# Patient Record
Sex: FEMALE | Race: BLACK OR AFRICAN AMERICAN | NOT HISPANIC OR LATINO | Employment: FULL TIME | URBAN - METROPOLITAN AREA
[De-identification: names, ages, dates, MRNs, and addresses within clinical notes are randomized per-mention and may not be internally consistent; named-entity substitution may affect disease eponyms.]

---

## 2017-01-26 ENCOUNTER — MYC MEDICAL ADVICE (OUTPATIENT)
Dept: OBGYN | Facility: CLINIC | Age: 47
End: 2017-01-26

## 2017-01-27 NOTE — TELEPHONE ENCOUNTER
Annual note (9/7/16) states: Pap: (No results found for this basename: pap ) NA, pt had done 2013 elsewhere  Last annual note on 9/7/16: Pap is no longer needed d/t benign indication for MAURICE/BSO    Just want to clarify from note above, the pt does not need to get PAP's anymore? Routing pt's [employer] message below to Dr. Ramos.

## 2017-01-27 NOTE — TELEPHONE ENCOUNTER
Turned PAP screen off in pt health maintenance. Called pt and informed her of Dr. Ramos' note below. Pt had no further questions.

## 2017-01-27 NOTE — TELEPHONE ENCOUNTER
Ok, will wait to hear regarding remaining labs. Tell her she doesn't need paps anymore b/c of the hyst but the system doesn't recognize that. Please ask whoever is doing the health maintenance to put in her hyst in a recognizable way to the system so that it doesn't trigger needing a pap. I think KR knows what to do for this

## 2017-02-06 ENCOUNTER — TELEPHONE (OUTPATIENT)
Dept: OBGYN | Facility: CLINIC | Age: 47
End: 2017-02-06

## 2017-02-06 NOTE — TELEPHONE ENCOUNTER
LM on PHI stating if patient has not had a RP creatinine done with her other recent labs, that she should call back to set up lab appt since she's overdue.

## 2017-02-07 ENCOUNTER — TELEPHONE (OUTPATIENT)
Dept: OBGYN | Facility: CLINIC | Age: 47
End: 2017-02-07

## 2017-02-07 NOTE — TELEPHONE ENCOUNTER
Left message for patient to remind her that she is due for a creatinine recheck. Told patient to call us back to schedule a lab draw or let us know if she can get this done somewhere closer to [place] if [place] out of [place].

## 2017-03-20 ENCOUNTER — TELEPHONE (OUTPATIENT)
Dept: OBGYN | Facility: CLINIC | Age: 47
End: 2017-03-20

## 2017-03-20 NOTE — TELEPHONE ENCOUNTER
Fourth attempt. First three were calls with voicemails left. Sending letter regarding creatinine lab that is due and to call back to schedule an appt or if she's had done elsewhere to let us know.

## 2017-03-30 ENCOUNTER — MYC MEDICAL ADVICE (OUTPATIENT)
Dept: OBGYN | Facility: CLINIC | Age: 47
End: 2017-03-30

## 2017-03-31 NOTE — TELEPHONE ENCOUNTER
Ivon Fontana, [facility]        3/20/17 10:22 AM   Note      Fourth attempt. First three were calls with voicemails left. Sending letter regarding creatinine lab that is due and to call back to schedule an appt or if she's had done elsewhere to let us know.          Note routed to Dr. Ramos as an FYI- see pt's mychart message below.

## 2017-05-25 NOTE — TELEPHONE ENCOUNTER
I never got her [email or web address] message until now. A creatinine of 1.0 is normal, it's not too low. So we can let her know that. I'll wait to hear what her repeat was.

## 2017-08-31 DIAGNOSIS — F33.1 MODERATE EPISODE OF RECURRENT MAJOR DEPRESSIVE DISORDER (H): ICD-10-CM

## 2017-08-31 RX ORDER — BUPROPION HYDROCHLORIDE 300 MG/1
TABLET ORAL
Qty: 90 TABLET | Refills: 0 | OUTPATIENT
Start: 2017-08-31

## 2017-08-31 RX ORDER — BUPROPION HYDROCHLORIDE 300 MG/1
TABLET ORAL
Qty: 30 TABLET | Refills: 0 | Status: SHIPPED | OUTPATIENT
Start: 2017-08-31 | End: 2017-10-13

## 2017-08-31 NOTE — TELEPHONE ENCOUNTER
Pending Prescriptions:                       Disp   Refills    buPROPion (WELLBUTRIN XL) 300 MG 24 hr tab*90 tab*0        Sig: TAKE 1 TABLET BY MOUTH DAILY EVERY MORNING       Last Written Prescription Date: 09/07/16  Last Fill Quantity: 90; # refills: 3  Last Office Visit with [facility], [facility] or [facility] prescribing provider:  11/11/16   Future visit: None, due for annual next month (Sept)    Last PHQ-9 score on record=   PHQ-9 SCORE 11/11/2016   Total Score 4       Lab Results   Component Value Date    AST 13 11/11/2016     Lab Results   Component Value Date    ALT 26 11/11/2016

## 2017-09-06 ENCOUNTER — MYC REFILL (OUTPATIENT)
Dept: OBGYN | Facility: CLINIC | Age: 47
End: 2017-09-06

## 2017-09-06 DIAGNOSIS — N95.1 SYMPTOMS, SUCH AS FLUSHING, SLEEPLESSNESS, HEADACHE, LACK OF CONCENTRATION, ASSOCIATED WITH THE MENOPAUSE: ICD-10-CM

## 2017-09-07 NOTE — TELEPHONE ENCOUNTER
BIEST      Last Written Prescription Date:  9/7/16  Last Fill Quantity: 17ml,   # refills: 12  Last Office Visit with [facility] primary care provider:  9/7/16  Future Office visit: 10/13/17    Progesterone 10%      Last Written Prescription Date:  9/7/16  Last Fill Quantity: 30mL,   # refills: 12  Last Office Visit with [facility] primary care provider:  9/7/16  Future Office visit: 10/13/17    Routing refill request to provider for review/approval because:  Drug not on the [facility], [facility] or [facility] refill protocol or controlled substance. Pt due for annual, appt scheduled. Routing to Dr. Ramos as RN cannot sign for BIEST. Ok for refill?

## 2017-09-07 NOTE — TELEPHONE ENCOUNTER
Message from [employer]:  Original authorizing provider: MD Ximena Freire would like a refill of the following medications:  order for DME [Muna Ramos MD]  order for DME [Muna Ramos MD]    Preferred pharmacy: [facility] fax [phone]    Comment:

## 2017-09-08 NOTE — TELEPHONE ENCOUNTER
Pt calling to say that the pharmacy says they never received the faxed RX's for her Progesterone 10%  /  Biest 2mg  I reviewed the faxed  Information w/ the pt. Pt indicates that she did just schedule her annual exam next month. She is requesting a 2 month supply until she sees Dr Ramos.  I called [facility] @ [phone] and gave a verbal order  to Bambi the pharmacist for 2 month supply for both Progesterone 10% and Biest 2mg.  Contacted the pt to let her know her Rx is being processed at her pharmacy.

## 2017-10-13 ENCOUNTER — OFFICE VISIT (OUTPATIENT)
Dept: OBGYN | Facility: CLINIC | Age: 47
End: 2017-10-13

## 2017-10-13 VITALS
DIASTOLIC BLOOD PRESSURE: 66 MMHG | SYSTOLIC BLOOD PRESSURE: 120 MMHG | WEIGHT: 161 LBS | BODY MASS INDEX: 28.53 KG/M2 | HEIGHT: 63 IN | HEART RATE: 72 BPM

## 2017-10-13 DIAGNOSIS — N95.1 SYMPTOMS, SUCH AS FLUSHING, SLEEPLESSNESS, HEADACHE, LACK OF CONCENTRATION, ASSOCIATED WITH THE MENOPAUSE: ICD-10-CM

## 2017-10-13 DIAGNOSIS — G43.109 MIGRAINE WITH AURA AND WITHOUT STATUS MIGRAINOSUS, NOT INTRACTABLE: ICD-10-CM

## 2017-10-13 DIAGNOSIS — F33.1 MODERATE EPISODE OF RECURRENT MAJOR DEPRESSIVE DISORDER (H): ICD-10-CM

## 2017-10-13 DIAGNOSIS — Z01.419 ENCOUNTER FOR GYNECOLOGICAL EXAMINATION WITHOUT ABNORMAL FINDING: Primary | ICD-10-CM

## 2017-10-13 DIAGNOSIS — R79.89 ELEVATED SERUM CREATININE: ICD-10-CM

## 2017-10-13 PROCEDURE — 99396 PREV VISIT EST AGE 40-64: CPT | Performed by: OBSTETRICS & GYNECOLOGY

## 2017-10-13 PROCEDURE — 99214 OFFICE O/P EST MOD 30 MIN: CPT | Mod: 25 | Performed by: OBSTETRICS & GYNECOLOGY

## 2017-10-13 RX ORDER — BUPROPION HYDROCHLORIDE 300 MG/1
TABLET ORAL
Qty: 90 TABLET | Refills: 3 | Status: SHIPPED | OUTPATIENT
Start: 2017-10-13 | End: 2018-11-14

## 2017-10-13 RX ORDER — ESTRADIOL 0.07 MG/D
1 FILM, EXTENDED RELEASE TRANSDERMAL
Qty: 8 PATCH | Refills: 3 | Status: SHIPPED | OUTPATIENT
Start: 2017-10-16 | End: 2017-10-13

## 2017-10-13 ASSESSMENT — ANXIETY QUESTIONNAIRES
6. BECOMING EASILY ANNOYED OR IRRITABLE: SEVERAL DAYS
3. WORRYING TOO MUCH ABOUT DIFFERENT THINGS: MORE THAN HALF THE DAYS
7. FEELING AFRAID AS IF SOMETHING AWFUL MIGHT HAPPEN: SEVERAL DAYS
GAD7 TOTAL SCORE: 9
IF YOU CHECKED OFF ANY PROBLEMS ON THIS QUESTIONNAIRE, HOW DIFFICULT HAVE THESE PROBLEMS MADE IT FOR YOU TO DO YOUR WORK, TAKE CARE OF THINGS AT HOME, OR GET ALONG WITH OTHER PEOPLE: SOMEWHAT DIFFICULT
5. BEING SO RESTLESS THAT IT IS HARD TO SIT STILL: SEVERAL DAYS
2. NOT BEING ABLE TO STOP OR CONTROL WORRYING: SEVERAL DAYS
1. FEELING NERVOUS, ANXIOUS, OR ON EDGE: MORE THAN HALF THE DAYS

## 2017-10-13 ASSESSMENT — PATIENT HEALTH QUESTIONNAIRE - PHQ9
SUM OF ALL RESPONSES TO PHQ QUESTIONS 1-9: 7
5. POOR APPETITE OR OVEREATING: SEVERAL DAYS

## 2017-10-13 NOTE — Clinical Note
Do you mind calling her one last time and ask her if she ever had the kidney test repeated in [place] since I totally forgot to ask her yesterday? If she did can we get her other clinics contact info and request results, and if she didn't, can we make sure she comes in some time this week while she's in [place] and does it. Doesn't have to be fasting so can come anytime her schedule allows. Thx!

## 2017-10-13 NOTE — MR AVS SNAPSHOT
After Visit Summary   10/13/2017    Ximena Lindsey    MRN: [id number]           Patient Information     Date Of Birth          1970        Visit Information        Provider Department      10/13/2017 3:00 PM Muna Ramos MD [facility]        Today's Diagnoses     Encounter for gynecological examination without abnormal finding    -  1    Moderate episode of recurrent major depressive disorder (H)        Symptoms, such as flushing, sleeplessness, headache, lack of concentration, associated with the menopause        Elevated serum creatinine        Migraine with aura and without status migrainosus, not intractable           Follow-ups after your visit        Who to contact     If you have questions or need follow up information about today's clinic visit or your schedule please contact [facility] directly at [phone].  Normal or non-critical lab and imaging results will be communicated to you by MyChart, letter or phone within 4 business days after the clinic has received the results. If you do not hear from us within 7 days, please contact the clinic through MyChart or phone. If you have a critical or abnormal lab result, we will notify you by phone as soon as possible.  Submit refill requests through [facility] or call your pharmacy and they will forward the refill request to us. Please allow 3 business days for your refill to be completed.          Additional Information About Your Visit        MyChart Information     [facility] gives you secure access to your electronic health record. If you see a primary care provider, you can also send messages to your care team and make appointments. If you have questions, please call your primary care clinic.  If you do not have a primary care provider, please call [phone] and they will assist you.        Care EveryWhere ID     This is your Care EveryWhere ID. This could be used by other organizations  "to access your [place] medical records  [id number]        Your Vitals Were     Pulse Height BMI (Body Mass Index)             72 5' 3\" (1.6 m) 28.52 kg/m2          Blood Pressure from Last 3 Encounters:   10/13/17 120/66   11/11/16 122/80   09/07/16 118/60    Weight from Last 3 Encounters:   10/13/17 161 lb (73 kg)   11/11/16 165 lb (74.8 kg)   09/07/16 170 lb (77.1 kg)              Today, you had the following     No orders found for display         Today's Medication Changes          These changes are accurate as of: 10/13/17 11:59 PM.  If you have any questions, ask your nurse or doctor.               Start taking these medicines.        Dose/Directions    estradiol 0.075 MG/24HR BIW patch   Commonly known as:  VIVELLE-DOT   Used for:  Symptoms, such as flushing, sleeplessness, headache, lack of concentration, associated with the menopause   Started by:  Muna Ramos MD        Dose:  1 patch   Start taking on:  10/16/2017   Place 1 patch onto the skin twice a week   Quantity:  8 patch   Refills:  3         These medicines have changed or have updated prescriptions.        Dose/Directions    buPROPion 300 MG 24 hr tablet   Commonly known as:  WELLBUTRIN XL   This may have changed:  See the new instructions.   Used for:  Moderate episode of recurrent major depressive disorder (H)   Changed by:  Muna Ramos MD        TAKE 1 TABLET BY MOUTH DAILY EVERY MORNING   Quantity:  90 tablet   Refills:  3         Stop taking these medicines if you haven't already. Please contact your care team if you have questions.     order for DME   Stopped by:  Muna Ramos MD                Where to get your medicines      These medications were sent to [facility] [place], [place] - [address] AT [facility] [address], [facility] [address]     Phone:  [phone]     buPROPion 300 MG 24 hr tablet    estradiol 0.075 MG/24HR BIW patch                Primary Care Provider Fax # "    Provider Not In System [phone]                Equal Access to Services     AURY FELICIANO : Hadii aad ku hadhemantluke Fragoso, michelle fonseca, vlad espinozamalinda eastman, mihir vargas. So [facility] [phone].    ATENCIÓN: Si habla español, tiene a landeros disposición servicios gratuitos de asistencia lingüística. Llame al [phone].    We comply with applicable federal civil rights laws and [place] laws. We do not discriminate on the basis of race, color, national origin, age, disability, sex, sexual orientation, or gender identity.            Thank you!     Thank you for choosing [facility]  for your care. Our goal is always to provide you with excellent care. Hearing back from our patients is one way we can continue to improve our services. Please take a few minutes to complete the written survey that you may receive in the mail after your visit with us. Thank you!             Your Updated Medication List - Protect others around you: Learn how to safely use, store and throw away your medicines at www.disposemymeds.org.          This list is accurate as of: 10/13/17 11:59 PM.  Always use your most recent med list.                   Brand Name Dispense Instructions for use Diagnosis    buPROPion 300 MG 24 hr tablet    WELLBUTRIN XL    90 tablet    TAKE 1 TABLET BY MOUTH DAILY EVERY MORNING    Moderate episode of recurrent major depressive disorder (H)       estradiol 0.075 MG/24HR BIW patch   Start taking on:  10/16/2017    VIVELLE-DOT    8 patch    Place 1 patch onto the skin twice a week    Symptoms, such as flushing, sleeplessness, headache, lack of concentration, associated with the menopause       topiramate 50 MG tablet    TOPAMAX
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
## 2017-10-14 PROBLEM — R79.89 ELEVATED SERUM CREATININE: Status: ACTIVE | Noted: 2017-10-14

## 2017-10-14 PROBLEM — G43.109 MIGRAINE WITH AURA AND WITHOUT STATUS MIGRAINOSUS, NOT INTRACTABLE: Status: ACTIVE | Noted: 2017-10-14

## 2017-10-14 ASSESSMENT — ANXIETY QUESTIONNAIRES: GAD7 TOTAL SCORE: 9

## 2017-10-16 RX ORDER — ESTRADIOL 0.07 MG/D
FILM, EXTENDED RELEASE TRANSDERMAL
Qty: 24 PATCH | Refills: 3 | Status: SHIPPED | OUTPATIENT
Start: 2017-10-16 | End: 2018-06-11 | Stop reason: ALTCHOICE

## 2017-10-16 NOTE — TELEPHONE ENCOUNTER
"Estradiol 0.075      Last Written Prescription Date:  10/16/17  Last Fill Quantity: 8,   # refills: 3  Last Office Visit with [facility] primary care provider:  10/13/17  Future Office visit: none    POC note 10/13/17: \"B/c her current HRT is unknown in comparison to regular HRT we will switch her to a .075mg vivelle patch and then in 6 months if feeling well can try to wean her to .05mg or she could even just cut a sliver off the patch and self wean until we find the dose that works for her but is the lowest dose possible. If her .075mg isn't enough she will know this in 1 week at most and then we can adjust up on it.  \"    Routing refill request to provider for review/approval because:  Pharmacy requesting a 90 day supply. Routing to Dr. Ramos- ok to adjust dispense amount to 24 patches with 3 rf to give year supply?   "

## 2017-12-19 ENCOUNTER — MYC REFILL (OUTPATIENT)
Dept: OBGYN | Facility: CLINIC | Age: 47
End: 2017-12-19

## 2017-12-19 DIAGNOSIS — N95.1 SYMPTOMS, SUCH AS FLUSHING, SLEEPLESSNESS, HEADACHE, LACK OF CONCENTRATION, ASSOCIATED WITH THE MENOPAUSE: ICD-10-CM

## 2017-12-19 RX ORDER — ESTRADIOL 0.07 MG/D
FILM, EXTENDED RELEASE TRANSDERMAL
Qty: 24 PATCH | Refills: 3 | Status: CANCELLED | OUTPATIENT
Start: 2017-12-19

## 2017-12-19 NOTE — TELEPHONE ENCOUNTER
Please see my chart below. Routing to Dr. Ramos. Please advise.     Pt is currently taking estradiol (VIVELLE-DOT) 0.075 MG/24HR BIW patch which she is cutting in half.     10/13/17 After our discussion the patient did feel that this was the best approach. B/c her current HRT is unknown in comparison to regular HRT we will switch her to a .075mg vivelle patch and then in 6 months if feeling well can try to wean her to .05mg or she could even just cut a sliver off the patch and self wean until we find the dose that works for her but is the lowest dose possible. If her .075mg isn't enough she will know this in 1 week at most and then we can adjust up on it.

## 2017-12-19 NOTE — TELEPHONE ENCOUNTER
Message from Solais Lightingt:  Original authorizing provider: MD Ximena Freirelanette Lindsey would like a refill of the following medications:  estradiol (VIVELLE-DOT) 0.075 MG/24HR BIW patch [Muna Ramos MD]    Preferred pharmacy: Other - [facility] [address], [place] [address]    Comment:  I've been cutting the patch like Dr. Ramos said I could do. I was having major breast tenderness. I need to go down in strength this time. Please send to [facility] [address] [place] [address] [phone] Thank you!

## 2017-12-21 RX ORDER — ESTRADIOL 0.05 MG/D
1 PATCH, EXTENDED RELEASE TRANSDERMAL
Qty: 24 PATCH | Refills: 1 | Status: SHIPPED | OUTPATIENT
Start: 2017-12-21 | End: 2018-06-11

## 2017-12-21 NOTE — TELEPHONE ENCOUNTER
Has she been cutting in half or a sliver off or what?   We can send the .05mg dose and then she can start with that or cut that one down a bit until gets to dose that works best for her.   Usually the breast tenderness does get better with some time.   Can send the .05mg patches #24 with 1 RF   AJ     Per Sharri RN's not below pt is cutting the patch in half.   Rx sent, [employer] message sent to pt.

## 2017-12-31 ENCOUNTER — HEALTH MAINTENANCE LETTER (OUTPATIENT)
Age: 47
End: 2017-12-31

## 2018-04-01 ENCOUNTER — TRANSFERRED RECORDS (OUTPATIENT)
Dept: HEALTH INFORMATION MANAGEMENT | Facility: CLINIC | Age: 48
End: 2018-04-01

## 2018-06-10 DIAGNOSIS — N95.1 SYMPTOMS, SUCH AS FLUSHING, SLEEPLESSNESS, HEADACHE, LACK OF CONCENTRATION, ASSOCIATED WITH THE MENOPAUSE: ICD-10-CM

## 2018-06-11 ENCOUNTER — MYC REFILL (OUTPATIENT)
Dept: OBGYN | Facility: CLINIC | Age: 48
End: 2018-06-11

## 2018-06-11 DIAGNOSIS — N95.1 SYMPTOMS, SUCH AS FLUSHING, SLEEPLESSNESS, HEADACHE, LACK OF CONCENTRATION, ASSOCIATED WITH THE MENOPAUSE: ICD-10-CM

## 2018-06-11 RX ORDER — ESTRADIOL 0.05 MG/D
1 PATCH, EXTENDED RELEASE TRANSDERMAL
Qty: 24 PATCH | Refills: 1 | Status: SHIPPED | OUTPATIENT
Start: 2018-06-11 | End: 2018-11-14

## 2018-06-11 NOTE — TELEPHONE ENCOUNTER
Requested Prescriptions   Pending Prescriptions Disp Refills     estradiol (VIVELLE-DOT) 0.05 MG/24HR BIW patch 24 patch 1     Sig: Place 1 patch onto the skin twice a week   Last Written Prescription Date:  12/21/17  Last Fill Quantity: 24,  # refills: 1   Last office visit: 10/13/2017 with prescribing provider:  Dr. Ramos   Future Office Visit:      There is no refill protocol information for this order        Routing to Dr. Ramos. OK to send refill.       12/21/17 Has she been cutting in half or a sliver off or what?   We can send the .05mg dose and then she can start with that or cut that one down a bit until gets to dose that works best for her.   Usually the breast tenderness does get better with some time.   Can send the .05mg patches #24 with 1 RF   AJ

## 2018-06-12 RX ORDER — ESTRADIOL 0.05 MG/D
PATCH, EXTENDED RELEASE TRANSDERMAL
Qty: 24 PATCH | Refills: 0 | OUTPATIENT
Start: 2018-06-12

## 2018-06-12 NOTE — TELEPHONE ENCOUNTER
"Requested Prescriptions   Pending Prescriptions Disp Refills     estradiol (VIVELLE-DOT) 0.05 MG/24HR BIW patch [Pharmacy Med Name: ESTRADIOL 0.05MG PATCH (TWICE WK)] 24 patch 0     Sig: PLACE 1 PATCH ONTO SKIN TWICE A WEEK   Last Written Prescription Date:  6/11/18  Last Fill Quantity: 24,  # refills: 1   Last office visit: 10/13/2017 with prescribing provider:  Dr. Ramos   Future Office Visit:      Hormone Replacement Therapy Passed    6/10/2018 10:37 AM       Passed - Blood pressure under 140/90 in past 12 months    BP Readings from Last 3 Encounters:   10/13/17 120/66   11/11/16 122/80   09/07/16 118/60                Passed - Recent (12 mo) or future (30 days) visit within the authorizing provider's specialty    Patient had office visit in the last 12 months or has a visit in the next 30 days with authorizing provider or within the authorizing provider's specialty.  See \"Patient Info\" tab in inbasket, or \"Choose Columns\" in Meds & Orders section of the refill encounter.           Passed - Patient is 18 years of age or older       Passed - No active pregnancy on record       Passed - No positive pregnancy test on record in past 12 months      Duplicate. Rx sent 6/11/18 for 24 with 1 RF  "

## 2018-11-14 DIAGNOSIS — N95.1 SYMPTOMS, SUCH AS FLUSHING, SLEEPLESSNESS, HEADACHE, LACK OF CONCENTRATION, ASSOCIATED WITH THE MENOPAUSE: ICD-10-CM

## 2018-11-14 RX ORDER — ESTRADIOL 0.05 MG/D
PATCH, EXTENDED RELEASE TRANSDERMAL
Qty: 25 PATCH | Refills: 0 | Status: SHIPPED | OUTPATIENT
Start: 2018-11-14 | End: 2019-01-21

## 2018-11-14 NOTE — TELEPHONE ENCOUNTER
"Requested Prescriptions   Pending Prescriptions Disp Refills     estradiol (VIVELLE-DOT) 0.05 MG/24HR BIW patch [Pharmacy Med Name: ESTRADIOL 0.05MG PATCH (TWICE WK)] 25 patch 0     Sig: PLACE 1 PATCH TOPICALLY ONTO CLEAN SKIN TWICE WEEKLY    Hormone Replacement Therapy Failed    11/14/2018  4:34 PM       Failed - Blood pressure under 140/90 in past 12 months    BP Readings from Last 3 Encounters:   10/13/17 120/66   11/11/16 122/80   09/07/16 118/60                Failed - Recent (12 mo) or future (30 days) visit within the authorizing provider's specialty    Patient had office visit in the last 12 months or has a visit in the next 30 days with authorizing provider or within the authorizing provider's specialty.  See \"Patient Info\" tab in inbasket, or \"Choose Columns\" in Meds & Orders section of the refill encounter.             Passed - Patient is 18 years of age or older       Passed - No active pregnancy on record       Passed - No positive pregnancy test on record in past 12 months      Last Written Prescription Date:  11/15/18  Last Fill Quantity: 24,  # refills: 0   Last office visit: 10/13/2017 with prescribing provider:  Rachel   Future Office Visit:   Next 5 appointments (look out 90 days)     Jan 21, 2019 11:20 AM CST   PHYSICAL with Muna Ramos MD   [facility] ([facility])    [address] [place] [id number]   [phone]               Refill approved. Pt has appointment.   "

## 2019-01-21 ENCOUNTER — OFFICE VISIT (OUTPATIENT)
Dept: OBGYN | Facility: CLINIC | Age: 49
End: 2019-01-21
Payer: MEDICAID

## 2019-01-21 ENCOUNTER — ANCILLARY PROCEDURE (OUTPATIENT)
Dept: MAMMOGRAPHY | Facility: CLINIC | Age: 49
End: 2019-01-21
Payer: MEDICAID

## 2019-01-21 VITALS
BODY MASS INDEX: 25.95 KG/M2 | SYSTOLIC BLOOD PRESSURE: 112 MMHG | DIASTOLIC BLOOD PRESSURE: 68 MMHG | WEIGHT: 152 LBS | HEIGHT: 64 IN | HEART RATE: 68 BPM

## 2019-01-21 DIAGNOSIS — Z13.6 ENCOUNTER FOR LIPID SCREENING FOR CARDIOVASCULAR DISEASE: ICD-10-CM

## 2019-01-21 DIAGNOSIS — Z13.21 ENCOUNTER FOR VITAMIN DEFICIENCY SCREENING: ICD-10-CM

## 2019-01-21 DIAGNOSIS — N95.1 SYMPTOMS, SUCH AS FLUSHING, SLEEPLESSNESS, HEADACHE, LACK OF CONCENTRATION, ASSOCIATED WITH THE MENOPAUSE: ICD-10-CM

## 2019-01-21 DIAGNOSIS — K20.90 ESOPHAGITIS DETERMINED BY BIOPSY: ICD-10-CM

## 2019-01-21 DIAGNOSIS — Z13.228 SCREENING FOR METABOLIC DISORDER: ICD-10-CM

## 2019-01-21 DIAGNOSIS — Z13.29 SCREENING FOR THYROID DISORDER: ICD-10-CM

## 2019-01-21 DIAGNOSIS — F33.1 MODERATE EPISODE OF RECURRENT MAJOR DEPRESSIVE DISORDER (H): ICD-10-CM

## 2019-01-21 DIAGNOSIS — Z01.419 ENCOUNTER FOR GYNECOLOGICAL EXAMINATION WITHOUT ABNORMAL FINDING: Primary | ICD-10-CM

## 2019-01-21 DIAGNOSIS — Z12.31 VISIT FOR SCREENING MAMMOGRAM: ICD-10-CM

## 2019-01-21 DIAGNOSIS — R79.89 ELEVATED SERUM CREATININE: ICD-10-CM

## 2019-01-21 DIAGNOSIS — G43.019 INTRACTABLE MIGRAINE WITHOUT AURA AND WITHOUT STATUS MIGRAINOSUS: ICD-10-CM

## 2019-01-21 DIAGNOSIS — E53.8 LOW SERUM VITAMIN B12: ICD-10-CM

## 2019-01-21 DIAGNOSIS — Z13.220 ENCOUNTER FOR LIPID SCREENING FOR CARDIOVASCULAR DISEASE: ICD-10-CM

## 2019-01-21 LAB — VIT B12 SERPL-MCNC: 230 PG/ML (ref 193–986)

## 2019-01-21 PROCEDURE — 82306 VITAMIN D 25 HYDROXY: CPT | Performed by: OBSTETRICS & GYNECOLOGY

## 2019-01-21 PROCEDURE — 99396 PREV VISIT EST AGE 40-64: CPT | Performed by: OBSTETRICS & GYNECOLOGY

## 2019-01-21 PROCEDURE — 80061 LIPID PANEL: CPT | Performed by: OBSTETRICS & GYNECOLOGY

## 2019-01-21 PROCEDURE — 77067 SCR MAMMO BI INCL CAD: CPT | Mod: TC

## 2019-01-21 PROCEDURE — 36415 COLL VENOUS BLD VENIPUNCTURE: CPT | Performed by: OBSTETRICS & GYNECOLOGY

## 2019-01-21 PROCEDURE — 82607 VITAMIN B-12: CPT | Performed by: OBSTETRICS & GYNECOLOGY

## 2019-01-21 PROCEDURE — 84443 ASSAY THYROID STIM HORMONE: CPT | Performed by: OBSTETRICS & GYNECOLOGY

## 2019-01-21 PROCEDURE — 80053 COMPREHEN METABOLIC PANEL: CPT | Performed by: OBSTETRICS & GYNECOLOGY

## 2019-01-21 RX ORDER — OMEPRAZOLE 40 MG/1
CAPSULE, DELAYED RELEASE ORAL
Refills: 2 | COMMUNITY
Start: 2018-06-14 | End: 2019-01-21

## 2019-01-21 RX ORDER — OMEPRAZOLE 40 MG/1
CAPSULE, DELAYED RELEASE ORAL
Qty: 90 CAPSULE | Refills: 3 | Status: SHIPPED | OUTPATIENT
Start: 2019-01-21 | End: 2020-01-20

## 2019-01-21 RX ORDER — BUPROPION HYDROCHLORIDE 300 MG/1
TABLET ORAL
Qty: 90 TABLET | Refills: 3 | Status: SHIPPED | OUTPATIENT
Start: 2019-01-21 | End: 2020-01-20

## 2019-01-21 RX ORDER — ESTRADIOL 0.05 MG/D
PATCH, EXTENDED RELEASE TRANSDERMAL
Qty: 25 PATCH | Refills: 3 | Status: SHIPPED | OUTPATIENT
Start: 2019-01-21 | End: 2020-01-20

## 2019-01-21 RX ORDER — TOPIRAMATE 50 MG/1
50 TABLET, FILM COATED ORAL DAILY
Qty: 90 TABLET | Refills: 3 | Status: SHIPPED | OUTPATIENT
Start: 2019-01-21 | End: 2019-06-13

## 2019-01-21 ASSESSMENT — ANXIETY QUESTIONNAIRES
7. FEELING AFRAID AS IF SOMETHING AWFUL MIGHT HAPPEN: NOT AT ALL
1. FEELING NERVOUS, ANXIOUS, OR ON EDGE: SEVERAL DAYS
GAD7 TOTAL SCORE: 7
3. WORRYING TOO MUCH ABOUT DIFFERENT THINGS: SEVERAL DAYS
5. BEING SO RESTLESS THAT IT IS HARD TO SIT STILL: SEVERAL DAYS
IF YOU CHECKED OFF ANY PROBLEMS ON THIS QUESTIONNAIRE, HOW DIFFICULT HAVE THESE PROBLEMS MADE IT FOR YOU TO DO YOUR WORK, TAKE CARE OF THINGS AT HOME, OR GET ALONG WITH OTHER PEOPLE: SOMEWHAT DIFFICULT
2. NOT BEING ABLE TO STOP OR CONTROL WORRYING: SEVERAL DAYS
6. BECOMING EASILY ANNOYED OR IRRITABLE: SEVERAL DAYS

## 2019-01-21 ASSESSMENT — PATIENT HEALTH QUESTIONNAIRE - PHQ9
5. POOR APPETITE OR OVEREATING: MORE THAN HALF THE DAYS
SUM OF ALL RESPONSES TO PHQ QUESTIONS 1-9: 9

## 2019-01-21 ASSESSMENT — MIFFLIN-ST. JEOR: SCORE: 1291.53

## 2019-01-21 NOTE — PROGRESS NOTES
Please abstract the following data from this visit with this patient into the appropriate field in Epic:    Colonoscopy done on this date: 04/01/2018 (approximately), by this group: [facility], results were normal per pt. 5 year f/u due to family hx

## 2019-01-21 NOTE — PROGRESS NOTES
Ximena is a 48 year old  female who presents for annual exam.     Besides routine health maintenance, she has no other health concerns today .    HPI:  The patient's PCP is [facility]. Wondering about a PCP.    Patient has now moved full time to [place] from [place]. Is now  but still going through a ton of legal things to determine finances and alimony, etc.  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 Is living with her sister. Nice to have the social support there but also is feeling like she's in a tiny room with all her stuff in a suit case so that's hard.    Son dropped out of  and now isn't doing anything and his dad is enabling him but she can't do anything about that. Oldest daughter is still out in [place]. with her much older BF and not speaking to anyone including her sibs so literally knows nothing about her other than she's alive. Her middle daughter is the only one that seems to be doing the right things. Going to college. Came to [place] to visit over xmas. Trying to be the glue that keeps everyone else together as much as she can.    Has definitely been depressed over everythign but trying to be optimistic. Def thinks the wellbutrin is helping some but the rest is just having to process and go through it all. Knows that counseling may help but also can't afford to pay for it. Has lost quite a bit of weight and knows it's mostly unintentional b/c of sadness and stress and not because of diet and exercise. However isn't upset about it and still not at the BMi she'd want to be at either way.    Doing well with her ERT and wants to continue on it. No vasomotor sx    Had some gerd and chest pain. Went to GI and had EGD and proven esophagitis from gerd. prilosec is working for it though  topamax for migraine  prevention. Hasn't really had migraines in a long time but worried to stop it and get them back so would rather stay on it  Hasn't had fasting labs in a while and had some elevated kidney function in the past so wants to do labs again today      GYNECOLOGIC HISTORY:    No LMP recorded. Patient has had a hysterectomy.  Her current contraception method is: hysterectomy.  She  reports that  has never smoked. she has never used smokeless tobacco.    Patient is sexually active.  STD testing offered?  Declined  Last PHQ-9 score on record =   PHQ-9 SCORE 2019   PHQ-9 Total Score 9     Last GAD7 score on record =   EDVIN-7 SCORE 2019   Total Score 7     Alcohol Score = 4    HEALTH MAINTENANCE:  Cholesterol:  16   Total= 223, Triglycerides=92, HDL=74, SFL=476, FBS=77 (16), TSH=2.28  Last Mammo: 2 years ago, Result: normal, Next Mammo: today   Pap: NA, pt had hysterectomy  Colonoscopy:  Spring 2018 in [place], Result: normal, Next Colonoscopy: 5 years due to family hx.  Dexa:  Never    Health maintenance updated:  yes    HISTORY:  Obstetric History       T3      L3     SAB0   TAB0   Ectopic0   Multiple0   Live Births0       # Outcome Date GA Lbr Jorge/2nd Weight Sex Delivery Anes PTL Lv   3 Term 00    M       2 Term 97    F       1 Term 90    F              Patient Active Problem List   Diagnosis     Moderate episode of recurrent major depressive disorder (H)     Symptoms, such as flushing, sleeplessness, headache, lack of concentration, associated with the menopause     S/P total hysterectomy and bilateral salpingo-oophorectomy     Elevated serum creatinine     Migraine with aura and without status migrainosus, not intractable     Past Surgical History:   Procedure Laterality Date     HYSTERECTOMY VAGINAL, BILATERAL SALPINGO-OOPHERECTOMY, COMBINED  2003     TONSILLECTOMY        Social History     Tobacco Use     Smoking status: Never Smoker     Smokeless tobacco: Never Used  "  Substance Use Topics     Alcohol use: Yes     Alcohol/week: 0.0 oz      Problem (# of Occurrences) Relation (Name,Age of Onset)    Lactose Intolerance (1) Father (55)            Current Outpatient Medications   Medication Sig     buPROPion (WELLBUTRIN XL) 300 MG 24 hr tablet TAKE 1 TABLET BY MOUTH DAILY EVERY MORNING     estradiol (VIVELLE-DOT) 0.05 MG/24HR bi-weekly patch PLACE 1 PATCH TOPICALLY ONTO CLEAN SKIN TWICE WEEKLY     omeprazole (PRILOSEC) 40 MG DR capsule TK 1 C PO QAM     topiramate (TOPAMAX) 50 MG tablet Take 1 tablet (50 mg) by mouth daily     No current facility-administered medications for this visit.      No Known Allergies    Past medical, surgical, social and family histories were reviewed and updated in EPIC.    ROS:   12 point review of systems negative other than symptoms noted below.  Head: Nasal Congestion    EXAM:  /68   Pulse 68   Ht 1.613 m (5' 3.5\")   Wt 68.9 kg (152 lb)   BMI 26.50 kg/m     BMI: Body mass index is 26.5 kg/m .    PHYSICAL EXAM:  Constitutional:  Appearance: Well nourished, well developed, alert, in no acute distress  Neck:  Lymph Nodes:  No lymphadenopathy present    Thyroid:  Gland size normal, nontender, no nodules or masses present  on palpation  Chest:  Respiratory Effort:  Breathing unlabored  Cardiovascular:    Heart: Auscultation:  Regular rate, normal rhythm, no murmurs present  Breasts: Palpation of Breasts and Axillae:  No masses present on palpation, no breast tenderness. and No nodularity, asymmetry or nipple discharge bilaterally.  Gastrointestinal:   Abdominal Examination:  Abdomen nontender to palpation, tone normal without rigidity or guarding, no masses present, umbilicus without lesions   Liver and Spleen:  No hepatomegaly present, liver nontender to palpation    Hernias:  No hernias present  Lymphatic: Lymph Nodes:  No other lymphadenopathy present  Skin:  General Inspection:  No rashes present, no lesions present, no areas of "  discoloration    Genitalia and Groin:  No rashes present, no lesions present, no areas of  discoloration, no masses present  Neurologic/Psychiatric:    Mental Status:  Oriented X3     Pelvic Exam:  External Genitalia:     Normal appearance for age, no discharge present, no tenderness present, no inflammatory lesions present, color normal  Vagina:     Normal vaginal vault without central or paravaginal defects, no discharge present, no inflammatory lesions present, no masses present  Bladder:     Nontender to palpation  Urethra:   Urethral Body:  Urethra palpation normal, urethra structural support normal   Urethral Meatus:  No erythema or lesions present  Cervix:     Surgically absent  Uterus:     Surgically absent  Adnexa:     Surgically absent  Perineum:     Perineum within normal limits, no evidence of trauma, no rashes or skin lesions present  Anus:     Anus within normal limits, no hemorrhoids present  Inguinal Lymph Nodes:     No lymphadenopathy present    COUNSELING:   Reviewed preventive health counseling, as reflected in patient instructions    BMI: Body mass index is 26.5 kg/m .      ASSESSMENT:  48 year old female with satisfactory annual exam.    ICD-10-CM    1. Encounter for gynecological examination without abnormal finding Z01.419    2. Moderate episode of recurrent major depressive disorder (H) F33.1 buPROPion (WELLBUTRIN XL) 300 MG 24 hr tablet   3. Symptoms, such as flushing, sleeplessness, headache, lack of concentration, associated with the menopause N95.1 estradiol (VIVELLE-DOT) 0.05 MG/24HR bi-weekly patch   4. Encounter for lipid screening for cardiovascular disease Z13.220 Lipid Profile    Z13.6    5. Screening for metabolic disorder Z13.228 Comprehensive metabolic panel   6. Elevated serum creatinine R79.89 Comprehensive metabolic panel   7. Screening for thyroid disorder Z13.29 TSH with free T4 reflex   8. Encounter for vitamin deficiency screening Z13.21 Vitamin D Deficiency     Vitamin  B12   9. Low serum vitamin B12 R79.89 Vitamin B12   10. Esophagitis determined by biopsy K20.9 omeprazole (PRILOSEC) 40 MG DR capsule   11. Intractable migraine without aura and without status migrainosus G43.019 topiramate (TOPAMAX) 50 MG tablet       PLAN:  Pap Is no longer indicated given hyst for benign indications  No mammo last year but has one scheduled today  Refill vivelle dot, wellbutrin 300mg, omeprazole and topamax for above indications  Full lab testing today along with some vitamins that she's had proven deficiencies with in the past  Offered support and referral to counselors for her divorce and struggles she's going through. Declines for now but will reach out if changes her mind. Has her whole extended family here so has good support and someone around her all the time so optimistic she will get through it.    Muna Ramos MD

## 2019-01-21 NOTE — Clinical Note
Please abstract the following data from this visit with this patient into the appropriate field in Epic:Colonoscopy done on this date: 04/01/2018 (approximately), by this group: [facility], results were normal per pt. 5 year f/u due to family hx

## 2019-01-22 LAB
ALBUMIN SERPL-MCNC: 4.1 G/DL (ref 3.4–5)
ALP SERPL-CCNC: 79 U/L (ref 40–150)
ALT SERPL W P-5'-P-CCNC: 17 U/L (ref 0–50)
ANION GAP SERPL CALCULATED.3IONS-SCNC: 7 MMOL/L (ref 3–14)
AST SERPL W P-5'-P-CCNC: 17 U/L (ref 0–45)
BILIRUB SERPL-MCNC: 0.5 MG/DL (ref 0.2–1.3)
BUN SERPL-MCNC: 11 MG/DL (ref 7–30)
CALCIUM SERPL-MCNC: 9 MG/DL (ref 8.5–10.1)
CHLORIDE SERPL-SCNC: 113 MMOL/L (ref 94–109)
CHOLEST SERPL-MCNC: 196 MG/DL
CO2 SERPL-SCNC: 21 MMOL/L (ref 20–32)
CREAT SERPL-MCNC: 0.93 MG/DL (ref 0.52–1.04)
DEPRECATED CALCIDIOL+CALCIFEROL SERPL-MC: 41 UG/L (ref 20–75)
GFR SERPL CREATININE-BSD FRML MDRD: 72 ML/MIN/{1.73_M2}
GLUCOSE SERPL-MCNC: 74 MG/DL (ref 70–99)
HDLC SERPL-MCNC: 67 MG/DL
LDLC SERPL CALC-MCNC: 115 MG/DL
NONHDLC SERPL-MCNC: 129 MG/DL
POTASSIUM SERPL-SCNC: 3.9 MMOL/L (ref 3.4–5.3)
PROT SERPL-MCNC: 6.8 G/DL (ref 6.8–8.8)
SODIUM SERPL-SCNC: 141 MMOL/L (ref 133–144)
TRIGL SERPL-MCNC: 72 MG/DL
TSH SERPL DL<=0.005 MIU/L-ACNC: 1.57 MU/L (ref 0.4–4)

## 2019-01-22 ASSESSMENT — ANXIETY QUESTIONNAIRES: GAD7 TOTAL SCORE: 7

## 2019-01-29 ENCOUNTER — HOSPITAL ENCOUNTER (OUTPATIENT)
Dept: MAMMOGRAPHY | Facility: CLINIC | Age: 49
End: 2019-01-29
Attending: OBSTETRICS & GYNECOLOGY
Payer: MEDICAID

## 2019-01-29 ENCOUNTER — HOSPITAL ENCOUNTER (OUTPATIENT)
Dept: MAMMOGRAPHY | Facility: CLINIC | Age: 49
Discharge: HOME | End: 2019-01-29
Attending: OBSTETRICS & GYNECOLOGY | Admitting: OBSTETRICS & GYNECOLOGY
Payer: MEDICAID

## 2019-01-29 DIAGNOSIS — R92.8 ABNORMAL MAMMOGRAM: ICD-10-CM

## 2019-01-29 PROCEDURE — 76642 ULTRASOUND BREAST LIMITED: CPT | Mod: LT

## 2019-01-29 PROCEDURE — G0279 TOMOSYNTHESIS, MAMMO: HCPCS

## 2019-06-11 ENCOUNTER — MYC MEDICAL ADVICE (OUTPATIENT)
Dept: OBGYN | Facility: CLINIC | Age: 49
End: 2019-06-11

## 2019-06-11 DIAGNOSIS — G43.019 INTRACTABLE MIGRAINE WITHOUT AURA AND WITHOUT STATUS MIGRAINOSUS: ICD-10-CM

## 2019-06-12 NOTE — TELEPHONE ENCOUNTER
Called and left detailed vm and also sent [employer] message to clarify her requested dose of Topamax  1-Topamax 50mg BID  2-[facility] on [address] in [place]    Will await response

## 2019-06-13 RX ORDER — TOPIRAMATE 50 MG/1
50 TABLET, FILM COATED ORAL 2 TIMES DAILY
Qty: 180 TABLET | Refills: 1 | Status: SHIPPED | OUTPATIENT
Start: 2019-06-13 | End: 2019-06-17

## 2019-06-13 NOTE — TELEPHONE ENCOUNTER
Pt confirmed Topamax 50mg BID-[facility]

## 2019-06-17 ENCOUNTER — TELEPHONE (OUTPATIENT)
Dept: OBGYN | Facility: CLINIC | Age: 49
End: 2019-06-17

## 2019-06-17 DIAGNOSIS — G43.019 INTRACTABLE MIGRAINE WITHOUT AURA AND WITHOUT STATUS MIGRAINOSUS: ICD-10-CM

## 2019-06-17 RX ORDER — TOPIRAMATE 50 MG/1
50 TABLET, FILM COATED ORAL 2 TIMES DAILY
Qty: 180 TABLET | Refills: 1 | Status: SHIPPED | OUTPATIENT
Start: 2019-06-17 | End: 2019-12-13

## 2019-06-17 NOTE — TELEPHONE ENCOUNTER
Pt states that her pharmacy has not received her topomax rx yet.  We did send it on the 13th of June.  Can we resend a new rx over.

## 2019-08-27 ENCOUNTER — TELEPHONE (OUTPATIENT)
Dept: OBGYN | Facility: CLINIC | Age: 49
End: 2019-08-27

## 2019-08-27 DIAGNOSIS — N63.0 LUMP OR MASS IN BREAST: Primary | ICD-10-CM

## 2019-08-27 NOTE — TELEPHONE ENCOUNTER
Annual 1/21/19. Pt found lump in right breast, size of a pea. Tender to touch. No discharge from nipple. Wants to see only Dr. Ramos. Last mammogram 1/21/19 with recall on 1/29/19 for Left breast and US. Results benign. Routing to Dr. Ramos. Please advise.

## 2019-08-27 NOTE — TELEPHONE ENCOUNTER
I would work her in but i'm packed wed and next wed because of so many days off.  I can order her a breast U/S and likely she would just do a bilateral diagnostic mammo since fairly close to being due for her screening mammo anyway and then that would expedite things.  I suppose if she is willing to come at 0820 tomorrow I could see her then too

## 2019-08-27 NOTE — TELEPHONE ENCOUNTER
Pt unable to come in tomorrow morning. Would like to go to the breast center for US. Routing to Dr. Ramos. Please sign order for breast US

## 2019-09-05 ENCOUNTER — HOSPITAL ENCOUNTER (OUTPATIENT)
Dept: MAMMOGRAPHY | Facility: CLINIC | Age: 49
Discharge: HOME | End: 2019-09-05
Attending: OBSTETRICS & GYNECOLOGY | Admitting: OBSTETRICS & GYNECOLOGY
Payer: COMMERCIAL

## 2019-09-05 ENCOUNTER — HOSPITAL ENCOUNTER (OUTPATIENT)
Dept: MAMMOGRAPHY | Facility: CLINIC | Age: 49
End: 2019-09-05
Attending: OBSTETRICS & GYNECOLOGY
Payer: COMMERCIAL

## 2019-09-05 DIAGNOSIS — N63.0 LUMP OR MASS IN BREAST: ICD-10-CM

## 2019-09-05 PROCEDURE — G0279 TOMOSYNTHESIS, MAMMO: HCPCS

## 2019-09-05 PROCEDURE — 76642 ULTRASOUND BREAST LIMITED: CPT | Mod: RT

## 2019-12-13 DIAGNOSIS — G43.019 INTRACTABLE MIGRAINE WITHOUT AURA AND WITHOUT STATUS MIGRAINOSUS: ICD-10-CM

## 2019-12-16 RX ORDER — TOPIRAMATE 50 MG/1
50 TABLET, FILM COATED ORAL 2 TIMES DAILY
Qty: 180 TABLET | Refills: 0 | Status: SHIPPED | OUTPATIENT
Start: 2019-12-16 | End: 2020-01-20

## 2019-12-16 NOTE — TELEPHONE ENCOUNTER
"Requested Prescriptions   Pending Prescriptions Disp Refills     topiramate (TOPAMAX) 50 MG tablet [Pharmacy Med Name: TOPIRAMATE 50MG TABLETS] 180 tablet 0     Sig: TAKE 1 TABLET(50 MG) BY MOUTH TWICE DAILY       Anti-Seizure Meds Protocol  Failed - 12/13/2019  9:34 PM        Failed - Review Authorizing provider's last note.      Refer to last progress notes: confirm request is for original authorizing provider (cannot be through other providers).          Failed - Normal CBC on file in past 26 months     No lab results found.              Failed - Normal platelet count on file in past 26 months     No lab results found.            Passed - Recent (12 mo) or future (30 days) visit within the authorizing provider's specialty     Patient has had an office visit with the authorizing provider or a provider within the authorizing providers department within the previous 12 mos or has a future within next 30 days. See \"Patient Info\" tab in inbasket, or \"Choose Columns\" in Meds & Orders section of the refill encounter.              Passed - Normal ALT or AST on file in past 26 months     Recent Labs   Lab Test 01/21/19  1236   ALT 17     Recent Labs   Lab Test 01/21/19  1236   AST 17             Passed - Medication is active on med list        Passed - No active pregnancy on record        Passed - No positive pregnancy test in last 12 months        Last Written Prescription Date:  6/17/19  Last Fill Quantity: 180,  # refills: 1  Last office visit: 1/21/2019 with prescribing provider:  Rachel   Future Office Visit:   Next 5 appointments (look out 90 days)    Jan 20, 2020 10:50 AM CST  PHYSICAL with Muna Ramos MD  [facility] ([facility]) [address] [place] [id number]  [phone]         Medication is being filled for 1 time refill only due to:  appointment scheduled  Ruthy Morales RN on 12/16/2019 at 7:57 AM    "

## 2020-01-15 NOTE — PROGRESS NOTES
iXmena is a 49 year old  female who presents for annual exam.     Besides routine health maintenance,  she would like to discuss fatigue issues.    HPI:  The patient's PCP is Dr. Muna Ramos    Patient continues to struggle with fatigue and has complained of this for quite a few years. Has had a very stressful few years as well so never sure what was causing it.    Is now  and has lived full time in [place] for couple of years now. 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 Definitely more in line with her experience and likes it and likes her coworkers but really no room for upward mobility so not sure how long this will last or if she'll have to find something else but ok for the short term.    Middle daughter graduated college and is doing great. Only one she talks to on a regular basis were all together with her x and her 3 kids at [employer] Lab graduation and her oldest daughter made it horribly awkward. Hasn't talked to her in years. Upset by divorce, thinks it was her fault. She's also dating someone much much older and has for years and thinks she assumes Ximena wouldn't approve so just make a distance. Son is still living in [place] with his dad. Not going to school and dad is enabling him to not work or finish schooling so just has to remove herself from that.    Patient is feeling tired all the time. Has actually lost quite a bit of weight and is happy about it but when pressed on how this occurred she admits she's just not eating. No appetite. Not interested in food and it doesn't taste good. Isn't trying to lose and isn't intentionally restricting but just doesn't want to eat. Not exercising at all. Patient has her sister and a couple close friends here. isnt isolating and feels like has good support but admits  when pressed that likely is just really sad, no hai, no enjoyment, feels a bit hopeless and empty and unfulfilled. Sad about her family and kids, her career. Lonely but has no interest in dating or even thinking about that. Her anxiety is not well controlled. Worries and overthinks and ruminates but doesn't always show it outwardly. States she's not that depressed and phq is more about fatigue but in discussing it seems that that is not fully accurate. Has tried an serotonin specific reuptake inhibitor or two years ago but long time ago. Has been on wellbutrin for awhile and definitely thinks that is has helped since starting it a couple years ago but definitely not fully.    Has had low b12 and some other vitamins in past. Wants full fasting labs and vitamins checked to see if it's the cause of her fatigue.  Had a hyst so not sure exactly when/if menopausal but having some very mild and occasional vasomotor sx and nothing significant      GYNECOLOGIC HISTORY:    No LMP recorded. Patient has had a hysterectomy.      Her current contraception method is: hysterectomy.  She  reports that she has never smoked. She has never used smokeless tobacco.    Patient is sexually active.  STD testing offered?  Declined  Last PHQ-9 score on record =   PHQ-9 SCORE 2020   PHQ-9 Total Score 7     Last GAD7 score on record =   EDVIN-7 SCORE 2020   Total Score 13     Alcohol Score = 4    HEALTH MAINTENANCE:  Cholesterol:    Recent Labs   Lab Test 19  1236 16  1205   CHOL 196 223*   HDL 67 74   * 131*   TRIG 72 92   FBS 74      TSH   Date Value Ref Range Status   2020 1.76 0.40 - 4.00 mU/L Final       Last Mammo: 19, Result: Normal, Next Mammo: 2020  Pap:  NA, pt had hyst  Colonoscopy:  Spring 2018, Result: Normal, Next Colonoscopy: 3.5 years.  Dexa:  NA    Health maintenance updated:  yes, reviewed vaccines. Pt states will get Tdap today, discuss shingrix    HISTORY:  OB History    Para  Term  AB Living   3 3 3 0 0 3   SAB TAB Ectopic Multiple Live Births   0 0 0 0 3      # Outcome Date GA Lbr Jorge/2nd Weight Sex Delivery Anes PTL Lv   3 Term 00    M    SHANNAN   2 Term 97    F    SHANNAN   1 Term 90    F    SHANNAN       Patient Active Problem List   Diagnosis     Moderate episode of recurrent major depressive disorder (H)     Symptoms, such as flushing, sleeplessness, headache, lack of concentration, associated with the menopause     S/P total hysterectomy and bilateral salpingo-oophorectomy     Elevated serum creatinine     Migraine with aura and without status migrainosus, not intractable     Past Surgical History:   Procedure Laterality Date     HYSTERECTOMY VAGINAL, BILATERAL SALPINGO-OOPHERECTOMY, COMBINED       TONSILLECTOMY        Social History     Tobacco Use     Smoking status: Never Smoker     Smokeless tobacco: Never Used   Substance Use Topics     Alcohol use: Yes     Alcohol/week: 0.0 standard drinks      Problem (# of Occurrences) Relation (Name,Age of Onset)    Lactose Intolerance (1) Father (55)    No Known Problems (7) Mother, Sister, Brother, Maternal Grandmother, Maternal Grandfather, Paternal Grandmother, Other            Current Outpatient Medications   Medication Sig     buPROPion (WELLBUTRIN XL) 300 MG 24 hr tablet TAKE 1 TABLET BY MOUTH DAILY EVERY MORNING     escitalopram (LEXAPRO) 10 MG tablet Take 1 tablet (10 mg) by mouth daily But start with 1/2 tab for the first few days     estradiol (VIVELLE-DOT) 0.05 MG/24HR bi-weekly patch PLACE 1 PATCH TOPICALLY ONTO CLEAN SKIN TWICE WEEKLY     Fexofenadine HCl (ALLEGRA PO)      omeprazole (PRILOSEC) 40 MG DR capsule TK 1 C PO QAM     topiramate (TOPAMAX) 50 MG tablet Take 1 tablet (50 mg) by mouth 2 times daily . Appointment needed for additional refills.     VITAMIN D PO      Current Facility-Administered Medications   Medication     cyanocobalamin injection 1,000 mcg     Allergies   Allergen Reactions      "Seasonal Allergies        Past medical, surgical, social and family histories were reviewed and updated in EPIC.    ROS:   12 point review of systems negative other than symptoms noted below or in the HPI.  Constitutional: Fatigue  No urinary frequency or dysuria, bladder or kidney problems, POSITIVE for:, weight loss, depression, anxiety, mild vasomotor sx    EXAM:  /64   Pulse 70   Ht 1.613 m (5' 3.5\")   Wt 67 kg (147 lb 12.8 oz)   BMI 25.77 kg/m     BMI: Body mass index is 25.77 kg/m .    PHYSICAL EXAM:  Constitutional:   Appearance: Well nourished, well developed, alert, in no acute distress  Neck:  Lymph Nodes:  No lymphadenopathy present    Thyroid:  Gland size normal, nontender, no nodules or masses present  on palpation  Chest:  Respiratory Effort:  Breathing unlabored  Cardiovascular:    Heart: Auscultation:  Regular rate, normal rhythm, no murmurs present  Breasts: Palpation of Breasts and Axillae:  No masses present on palpation, no breast tenderness. and No nodularity, asymmetry or nipple discharge bilaterally.  Gastrointestinal:   Abdominal Examination:  Abdomen nontender to palpation, tone normal without rigidity or guarding, no masses present, umbilicus without lesions   Liver and Spleen:  No hepatomegaly present, liver nontender to palpation    Hernias:  No hernias present  Lymphatic: Lymph Nodes:  No other lymphadenopathy present  Skin:  General Inspection:  No rashes present, no lesions present, no areas of  discoloration  Neurologic:    Mental Status:  Oriented X3.  Normal strength and tone, sensory exam                grossly normal, mentation intact and speech normal.    Psychiatric:   Mentation appears normal and affect normal/bright.         Pelvic Exam:  External Genitalia:     Normal appearance for age, no discharge present, no tenderness present, no inflammatory lesions present, color normal  Vagina:     Normal vaginal vault without central or paravaginal defects, no discharge " present, no inflammatory lesions present, no masses present  Bladder:     Nontender to palpation  Urethra:   Urethral Body:  Urethra palpation normal, urethra structural support normal   Urethral Meatus:  No erythema or lesions present  Cervix:     Surgically absent  Uterus:     Surgically absent  Adnexa:     No adnexal tenderness present, no adnexal masses present  Perineum:     Perineum within normal limits, no evidence of trauma, no rashes or skin lesions present  Anus:     Anus within normal limits, no hemorrhoids present  Inguinal Lymph Nodes:     No lymphadenopathy present  Pubic Hair:     Normal pubic hair distribution for age  Genitalia and Groin:     No rashes present, no lesions present, no areas of discoloration, no masses present      COUNSELING:   Reviewed preventive health counseling, as reflected in patient instructions  Special attention given to:        mood       Regular exercise       Healthy diet/nutrition       (Darlene)menopause management    BMI: Body mass index is 25.77 kg/m .      ASSESSMENT:  49 year old female with satisfactory annual exam.    ICD-10-CM    1. Encounter for gynecological examination without abnormal finding Z01.419    2. Moderate episode of recurrent major depressive disorder (H) F33.1 buPROPion (WELLBUTRIN XL) 300 MG 24 hr tablet     escitalopram (LEXAPRO) 10 MG tablet   3. Symptoms, such as flushing, sleeplessness, headache, lack of concentration, associated with the menopause N95.1 estradiol (VIVELLE-DOT) 0.05 MG/24HR bi-weekly patch   4. Other fatigue R53.83 Iron     Ferritin   5. Esophagitis determined by biopsy K20.9 omeprazole (PRILOSEC) 40 MG DR capsule   6. Migraine with aura and without status migrainosus, not intractable G43.109 topiramate (TOPAMAX) 50 MG tablet   7. Encounter for lipid screening for cardiovascular disease Z13.220 Lipid panel reflex to direct LDL Fasting    Z13.6    8. Screening for metabolic disorder Z13.228 Comprehensive metabolic panel   9. Low  serum vitamin B12 E53.8 Vitamin B12     cyanocobalamin injection 1,000 mcg     GASTROENTEROLOGY ADULT REF CONSULT ONLY   10. Encounter for vitamin deficiency screening Z13.21 Vitamin D Deficiency   11. Screening for thyroid disorder Z13.29 TSH with free T4 reflex   12. Need for Tdap vaccination Z23 TDAP VACCINE     ADMIN 1st VACCINE   13. Vitamin B12 deficiency (non anemic) E53.8 cyanocobalamin injection 1,000 mcg     GASTROENTEROLOGY ADULT REF CONSULT ONLY       PLAN:  Pap is not indicated  mammo today  tdap today  Screening labs for lipids and metabolic panel but also for some vitamin levels, thyroid and iron to see if that could be the cause of her fatigue  Her fatigue could be worsened by topamax for migraines. However has been on it a long time and hasn't had nearly as many migraines since being on it so not sure she wants to change that.  Patient's wellbutrin has definitely helped her some over her baseline but at the time of starting it and trying serotonin specific reuptake inhibitor she was actively  from her , moving back to [place], actively having issues with kids so not a fair time to gauge the medicine effect only.  Do think she'd benefit from retrying an serotonin specific reuptake inhibitor with her wellbutrin. Will try lexapro. Start with 5mg and then slowly increase to 10mg. Could cause some increased sedation/fatigue at first but should improve with time. F/u with me in 6 weeks to see how she's doing. Can always increase her wellbutrin to 450mg if an serotonin specific reuptake inhibitor is in place to control anxiety b/c do think her depression is the cause of her weight loss and no appetite as well as her fatigue more than any other issue  Strongly encouraged her to find a therapist as she has a lot of family and social stressors to process that medicine alone can't help with   She now works for the [employer] so has 6 free EAP sessions. Should start with that and then can get recs from  that person on who to see if feels she needs to go beyond that.  Spent an additional 20 min, 100% of which was in face to face counseling time, discussed moods, weight loss, fatigue.    Muna Ramos MD

## 2020-01-20 ENCOUNTER — OFFICE VISIT (OUTPATIENT)
Dept: OBGYN | Facility: CLINIC | Age: 50
End: 2020-01-20
Payer: COMMERCIAL

## 2020-01-20 ENCOUNTER — ANCILLARY PROCEDURE (OUTPATIENT)
Dept: MAMMOGRAPHY | Facility: CLINIC | Age: 50
End: 2020-01-20
Payer: COMMERCIAL

## 2020-01-20 VITALS
HEIGHT: 64 IN | BODY MASS INDEX: 25.23 KG/M2 | SYSTOLIC BLOOD PRESSURE: 112 MMHG | DIASTOLIC BLOOD PRESSURE: 64 MMHG | HEART RATE: 70 BPM | WEIGHT: 147.8 LBS

## 2020-01-20 DIAGNOSIS — Z01.419 ENCOUNTER FOR GYNECOLOGICAL EXAMINATION WITHOUT ABNORMAL FINDING: Primary | ICD-10-CM

## 2020-01-20 DIAGNOSIS — Z12.31 VISIT FOR SCREENING MAMMOGRAM: ICD-10-CM

## 2020-01-20 DIAGNOSIS — E53.8 LOW SERUM VITAMIN B12: ICD-10-CM

## 2020-01-20 DIAGNOSIS — N95.1 SYMPTOMS, SUCH AS FLUSHING, SLEEPLESSNESS, HEADACHE, LACK OF CONCENTRATION, ASSOCIATED WITH THE MENOPAUSE: ICD-10-CM

## 2020-01-20 DIAGNOSIS — G43.109 MIGRAINE WITH AURA AND WITHOUT STATUS MIGRAINOSUS, NOT INTRACTABLE: ICD-10-CM

## 2020-01-20 DIAGNOSIS — E53.8 VITAMIN B12 DEFICIENCY (NON ANEMIC): ICD-10-CM

## 2020-01-20 DIAGNOSIS — R53.83 OTHER FATIGUE: ICD-10-CM

## 2020-01-20 DIAGNOSIS — F33.1 MODERATE EPISODE OF RECURRENT MAJOR DEPRESSIVE DISORDER (H): ICD-10-CM

## 2020-01-20 DIAGNOSIS — Z13.228 SCREENING FOR METABOLIC DISORDER: ICD-10-CM

## 2020-01-20 DIAGNOSIS — Z13.6 ENCOUNTER FOR LIPID SCREENING FOR CARDIOVASCULAR DISEASE: ICD-10-CM

## 2020-01-20 DIAGNOSIS — Z13.220 ENCOUNTER FOR LIPID SCREENING FOR CARDIOVASCULAR DISEASE: ICD-10-CM

## 2020-01-20 DIAGNOSIS — Z13.21 ENCOUNTER FOR VITAMIN DEFICIENCY SCREENING: ICD-10-CM

## 2020-01-20 DIAGNOSIS — Z13.29 SCREENING FOR THYROID DISORDER: ICD-10-CM

## 2020-01-20 DIAGNOSIS — K20.90 ESOPHAGITIS DETERMINED BY BIOPSY: ICD-10-CM

## 2020-01-20 DIAGNOSIS — Z23 NEED FOR TDAP VACCINATION: ICD-10-CM

## 2020-01-20 LAB — VIT B12 SERPL-MCNC: 148 PG/ML (ref 193–986)

## 2020-01-20 PROCEDURE — 82607 VITAMIN B-12: CPT | Performed by: OBSTETRICS & GYNECOLOGY

## 2020-01-20 PROCEDURE — 99214 OFFICE O/P EST MOD 30 MIN: CPT | Mod: 25 | Performed by: OBSTETRICS & GYNECOLOGY

## 2020-01-20 PROCEDURE — 90471 IMMUNIZATION ADMIN: CPT | Performed by: OBSTETRICS & GYNECOLOGY

## 2020-01-20 PROCEDURE — 36415 COLL VENOUS BLD VENIPUNCTURE: CPT | Performed by: OBSTETRICS & GYNECOLOGY

## 2020-01-20 PROCEDURE — 83540 ASSAY OF IRON: CPT | Performed by: OBSTETRICS & GYNECOLOGY

## 2020-01-20 PROCEDURE — 80053 COMPREHEN METABOLIC PANEL: CPT | Performed by: OBSTETRICS & GYNECOLOGY

## 2020-01-20 PROCEDURE — 80061 LIPID PANEL: CPT | Performed by: OBSTETRICS & GYNECOLOGY

## 2020-01-20 PROCEDURE — 77067 SCR MAMMO BI INCL CAD: CPT | Mod: TC

## 2020-01-20 PROCEDURE — 84443 ASSAY THYROID STIM HORMONE: CPT | Performed by: OBSTETRICS & GYNECOLOGY

## 2020-01-20 PROCEDURE — 82728 ASSAY OF FERRITIN: CPT | Performed by: OBSTETRICS & GYNECOLOGY

## 2020-01-20 PROCEDURE — 90715 TDAP VACCINE 7 YRS/> IM: CPT | Performed by: OBSTETRICS & GYNECOLOGY

## 2020-01-20 PROCEDURE — 99396 PREV VISIT EST AGE 40-64: CPT | Mod: 25 | Performed by: OBSTETRICS & GYNECOLOGY

## 2020-01-20 PROCEDURE — 82306 VITAMIN D 25 HYDROXY: CPT | Performed by: OBSTETRICS & GYNECOLOGY

## 2020-01-20 RX ORDER — ESCITALOPRAM OXALATE 10 MG/1
10 TABLET ORAL DAILY
Qty: 90 TABLET | Refills: 0 | Status: SHIPPED | OUTPATIENT
Start: 2020-01-20 | End: 2020-03-10

## 2020-01-20 RX ORDER — OMEPRAZOLE 40 MG/1
CAPSULE, DELAYED RELEASE ORAL
Qty: 90 CAPSULE | Refills: 3 | Status: SHIPPED | OUTPATIENT
Start: 2020-01-20 | End: 2021-01-22

## 2020-01-20 RX ORDER — BUPROPION HYDROCHLORIDE 300 MG/1
TABLET ORAL
Qty: 90 TABLET | Refills: 3 | Status: SHIPPED | OUTPATIENT
Start: 2020-01-20 | End: 2021-01-22

## 2020-01-20 RX ORDER — ESTRADIOL 0.05 MG/D
PATCH, EXTENDED RELEASE TRANSDERMAL
Qty: 25 PATCH | Refills: 3 | Status: SHIPPED | OUTPATIENT
Start: 2020-01-20 | End: 2021-01-22

## 2020-01-20 RX ORDER — TOPIRAMATE 50 MG/1
50 TABLET, FILM COATED ORAL 2 TIMES DAILY
Qty: 90 TABLET | Refills: 3 | Status: SHIPPED | OUTPATIENT
Start: 2020-01-20 | End: 2020-10-07

## 2020-01-20 ASSESSMENT — ANXIETY QUESTIONNAIRES
7. FEELING AFRAID AS IF SOMETHING AWFUL MIGHT HAPPEN: SEVERAL DAYS
GAD7 TOTAL SCORE: 13
3. WORRYING TOO MUCH ABOUT DIFFERENT THINGS: NEARLY EVERY DAY
5. BEING SO RESTLESS THAT IT IS HARD TO SIT STILL: SEVERAL DAYS
IF YOU CHECKED OFF ANY PROBLEMS ON THIS QUESTIONNAIRE, HOW DIFFICULT HAVE THESE PROBLEMS MADE IT FOR YOU TO DO YOUR WORK, TAKE CARE OF THINGS AT HOME, OR GET ALONG WITH OTHER PEOPLE: VERY DIFFICULT
1. FEELING NERVOUS, ANXIOUS, OR ON EDGE: SEVERAL DAYS
2. NOT BEING ABLE TO STOP OR CONTROL WORRYING: NEARLY EVERY DAY
6. BECOMING EASILY ANNOYED OR IRRITABLE: MORE THAN HALF THE DAYS

## 2020-01-20 ASSESSMENT — MIFFLIN-ST. JEOR: SCORE: 1267.48

## 2020-01-20 ASSESSMENT — PATIENT HEALTH QUESTIONNAIRE - PHQ9
SUM OF ALL RESPONSES TO PHQ QUESTIONS 1-9: 7
5. POOR APPETITE OR OVEREATING: MORE THAN HALF THE DAYS

## 2020-01-20 NOTE — NURSING NOTE
Prior to immunization administration, verified patients identity using patient s name and date of birth. Please see Immunization Activity for additional information.     Screening Questionnaire for Adult Immunization    Are you sick today?   No   Do you have allergies to medications, food, a vaccine component or latex?   No   Have you ever had a serious reaction after receiving a vaccination?   No   Do you have a long-term health problem with heart, lung, kidney, or metabolic disease (e.g., diabetes), asthma, a blood disorder, no spleen, complement component deficiency, a cochlear implant, or a spinal fluid leak?  Are you on long-term aspirin therapy?   No   Do you have cancer, leukemia, HIV/AIDS, or any other immune system problem?   No   Do you have a parent, brother, or sister with an immune system problem?   No   In the past 3 months, have you taken medications that affect  your immune system, such as prednisone, other steroids, or anticancer drugs; drugs for the treatment of rheumatoid arthritis, Crohn s disease, or psoriasis; or have you had radiation treatments?   No   Have you had a seizure, or a brain or other nervous system problem?   No   During the past year, have you received a transfusion of blood or blood    products, or been given immune (gamma) globulin or antiviral drug?   No   For women: Are you pregnant or is there a chance you could become       pregnant during the next month?   No   Have you received any vaccinations in the past 4 weeks?   No     Immunization questionnaire answers were all negative.        Per orders of Dr. Ramos, injection of Tdap given by Ivon Fontana CMA. Patient instructed to remain in clinic for 15 minutes afterwards, and to report any adverse reaction to me immediately.       Screening performed by Ivon Fontana CMA on 1/20/2020 at 11:43 AM.

## 2020-01-21 LAB
ALBUMIN SERPL-MCNC: 4 G/DL (ref 3.4–5)
ALP SERPL-CCNC: 60 U/L (ref 40–150)
ALT SERPL W P-5'-P-CCNC: 22 U/L (ref 0–50)
ANION GAP SERPL CALCULATED.3IONS-SCNC: 8 MMOL/L (ref 3–14)
AST SERPL W P-5'-P-CCNC: 18 U/L (ref 0–45)
BILIRUB SERPL-MCNC: 0.5 MG/DL (ref 0.2–1.3)
BUN SERPL-MCNC: 15 MG/DL (ref 7–30)
CALCIUM SERPL-MCNC: 8.6 MG/DL (ref 8.5–10.1)
CHLORIDE SERPL-SCNC: 110 MMOL/L (ref 94–109)
CHOLEST SERPL-MCNC: 213 MG/DL
CO2 SERPL-SCNC: 21 MMOL/L (ref 20–32)
CREAT SERPL-MCNC: 0.92 MG/DL (ref 0.52–1.04)
DEPRECATED CALCIDIOL+CALCIFEROL SERPL-MC: 44 UG/L (ref 20–75)
FERRITIN SERPL-MCNC: 38 NG/ML (ref 8–252)
GFR SERPL CREATININE-BSD FRML MDRD: 73 ML/MIN/{1.73_M2}
GLUCOSE SERPL-MCNC: 70 MG/DL (ref 70–99)
HDLC SERPL-MCNC: 82 MG/DL
IRON SERPL-MCNC: 84 UG/DL (ref 35–180)
LDLC SERPL CALC-MCNC: 117 MG/DL
NONHDLC SERPL-MCNC: 131 MG/DL
POTASSIUM SERPL-SCNC: 4.1 MMOL/L (ref 3.4–5.3)
PROT SERPL-MCNC: 7.1 G/DL (ref 6.8–8.8)
SODIUM SERPL-SCNC: 140 MMOL/L (ref 133–144)
TRIGL SERPL-MCNC: 71 MG/DL
TSH SERPL DL<=0.005 MIU/L-ACNC: 1.76 MU/L (ref 0.4–4)

## 2020-01-21 RX ORDER — CYANOCOBALAMIN 1000 UG/ML
1000 INJECTION, SOLUTION INTRAMUSCULAR; SUBCUTANEOUS
Status: DISCONTINUED | OUTPATIENT
Start: 2020-01-21 | End: 2023-07-17

## 2020-01-21 ASSESSMENT — ANXIETY QUESTIONNAIRES: GAD7 TOTAL SCORE: 13

## 2020-01-22 ENCOUNTER — ALLIED HEALTH/NURSE VISIT (OUTPATIENT)
Dept: NURSING | Facility: CLINIC | Age: 50
End: 2020-01-22
Payer: COMMERCIAL

## 2020-01-22 DIAGNOSIS — R79.89 LOW VITAMIN B12 LEVEL: Primary | ICD-10-CM

## 2020-01-22 PROCEDURE — 96372 THER/PROPH/DIAG INJ SC/IM: CPT

## 2020-01-22 RX ADMIN — CYANOCOBALAMIN 1000 MCG: 1000 INJECTION, SOLUTION INTRAMUSCULAR; SUBCUTANEOUS at 16:12

## 2020-01-22 NOTE — NURSING NOTE
Clinic Administered Medication Documentation    MEDICATION LIST:   Injectable Medication Documentation    Patient was given Cyanocobalamin (B-12). Prior to medication administration, verified patients identity using patient s name and date of birth. Please see MAR and medication order for additional information. Patient instructed to report any adverse reaction to staff immediately .      Was entire vial of medication used? Yes  Vial/Syringe: Single dose vial  Expiration Date:  4/2020  Was this medication supplied by the patient? No     Pt has received Vitamin B12 injections in past and tolerated well without side effects. Tolerated today's injection and discharged home with instructions to f/u with GI for further recommendations on VitB12 for future. She has one further injection available here in the MAR in 30 days if cannot get into GI earlier.  Pt verbalized understanding, in agreement with plan, and voiced no further questions..  Sarah Cedeno RN on 1/22/2020 at 4:19 PM

## 2020-02-07 ENCOUNTER — TRANSFERRED RECORDS (OUTPATIENT)
Dept: HEALTH INFORMATION MANAGEMENT | Facility: CLINIC | Age: 50
End: 2020-02-07

## 2020-03-02 ENCOUNTER — TRANSFERRED RECORDS (OUTPATIENT)
Dept: HEALTH INFORMATION MANAGEMENT | Facility: CLINIC | Age: 50
End: 2020-03-02

## 2020-03-03 ENCOUNTER — TRANSFERRED RECORDS (OUTPATIENT)
Dept: HEALTH INFORMATION MANAGEMENT | Facility: CLINIC | Age: 50
End: 2020-03-03

## 2020-03-05 NOTE — PROGRESS NOTES
SUBJECTIVE:                                                   Ximena Bess is a 49 year old female who presents to clinic today for the following health issue(s):  Patient presents with:  Follow Up: Lexapro follow up      HPI:  Patient is here for f/u on her lexapro start. Had been on SSRIs in the past and just never liked how they made her feel or they didn't work or she'd have a plateau  Found that of all of them wellbutrin worked the best so had been taking just that, 300mg daily, for a couple of years  When here for her annual was clearly feeling depressed. Not eating and losing weight, not able to fall asleep but then always contantly feeling tired. Then would go the other direction and feel like she could sleep all day and not get out of bed. Had just started a new job and was liking it but just could hardly motivate herself to get out of bed. Had friends and parents and both her sisters here and espeically the one she was really close to. Lots of struggles with her oldest and youngest during/after divorce but good relationship with her middle daughter. But just no hai, not feeling any happiness  Worrying a lot. Sometimes about true life stressors like money and her kids but usually doing it silently and not sharing it with many people  Decided to try adding serotonin specific reuptake inhibitor. Started on lexapro. 5mg for a week and then up to 10mg.  Having no side effects and tolerating it well and is pleasantly surprised by that b/c had side effects to all other SSRIs she'd tried  Thinks it may be helping with anxiety and worry a bit but not sure if it's helping too much with sadness and depression.    However in this same period of time she was found to have a low b12. Has had this before as well and needed shots for a long time. At one point was so low that needed the short every 2 weeks rather than monthly. Was horribly tired in past when this was found and feels the same fatigue now as  "then.  Rather than just replacing her b12 I sent her to heme to see if something causing it.  Had EGD and polyps were bx'd from her stomach as well as colonoscopy. First colonoscopy her prep wasn't good enough so had to drink more prep and come do it again the next day. Has been waiting for those results for over a week. Did get a blood test for pernicious anemia and that was negative per her report. That report was also in my records but the scope and findings and path were not. Called [facility] to get results sent over asap.    In addition to what she feels like is fatigue impacting her overall mood from the b12 her sister that she's very close to is moving to [place]. Very unexpected. Just moved full time to [place] to be closer to her and now her wife got a really good job in [place] that she can't pass up so she's moving. Just found out less than a month ago and is moving next week already. Has cried for hours thinking about that  Has one other sister but she's constantly anxious and depressed and worried about their parents, the finances, that she's the only one that can help them b/c no one else is in a financial situation to help and just isn't as close to her as her other sister.    Hard for her to gauge how much meds are working b/c has had so many other confounding factors this last 2 months    Did take my advice and starting seeing a therapist provided by [facility]. Has seen her 4 times or so. Has a hard time with how young she is and wondering if she has any experience or knowledge to even be helpful. Finally told her she felt that way and found out she's almost 29 which did help her a bit and got the \"elephant out of the room\". Still not sure how much it's helping but the last 2 sessions did feel like it was a bit better and cathartic.  Knows she's just a closed and introverted person and doesn't share much of her emotions easily so not sure if therapy will work for her but open to trying it. When asked how therapy is " "going and what is asked of her she says, \"i'm not sure\"  When asked if she's given homework, goals, things to accomplish between visits she reports she isn't. When asked what she told Suzanne, when suzanne asked if she wanted those things, she states \"I'm not asking for homework if I don't need it but I also hate being unprepared. Sometimes she'll ask me something and I don't have an answer and then wonder if this is something I should be thinking about and evaluating on my own time so I can be prepared if/when asked again\"    No LMP recorded. Patient has had a hysterectomy..     Patient is sexually active, .  Using hysterectomy for contraception.    reports that she has never smoked. She has never used smokeless tobacco.    STD testing offered?  Declined    Health maintenance updated:  yes    Today's PHQ-2 Score: No flowsheet data found.  Today's PHQ-9 Score:   PHQ-9 SCORE 3/9/2020   PHQ-9 Total Score 11     Today's EDVIN-7 Score:   EDVIN-7 SCORE 3/9/2020   Total Score 9       Problem list and histories reviewed & adjusted, as indicated.  Additional history: as documented.    Patient Active Problem List   Diagnosis     Moderate episode of recurrent major depressive disorder (H)     Symptoms, such as flushing, sleeplessness, headache, lack of concentration, associated with the menopause     S/P total hysterectomy and bilateral salpingo-oophorectomy     Elevated serum creatinine     Migraine with aura and without status migrainosus, not intractable     Past Surgical History:   Procedure Laterality Date     HYSTERECTOMY VAGINAL, BILATERAL SALPINGO-OOPHERECTOMY, COMBINED  2003     TONSILLECTOMY        Social History     Tobacco Use     Smoking status: Never Smoker     Smokeless tobacco: Never Used   Substance Use Topics     Alcohol use: Yes     Alcohol/week: 0.0 standard drinks      Problem (# of Occurrences) Relation (Name,Age of Onset)    Lactose Intolerance (1) Father (55)    No Known Problems (7) Mother, Sister, " "Brother, Maternal Grandmother, Maternal Grandfather, Paternal Grandmother, Other            Current Outpatient Medications   Medication Sig     buPROPion (WELLBUTRIN XL) 300 MG 24 hr tablet TAKE 1 TABLET BY MOUTH DAILY EVERY MORNING     [START ON 4/6/2020] cyanocobalamin (CYANOCOBALAMIN) 1000 MCG/ML injection Inject 1 mL (1,000 mcg) into the muscle every 30 days     escitalopram (LEXAPRO) 10 MG tablet Take 1 tablet (10 mg) by mouth daily     estradiol (VIVELLE-DOT) 0.05 MG/24HR bi-weekly patch PLACE 1 PATCH TOPICALLY ONTO CLEAN SKIN TWICE WEEKLY     Fexofenadine HCl (ALLEGRA PO)      omeprazole (PRILOSEC) 40 MG DR capsule TK 1 C PO QAM     topiramate (TOPAMAX) 50 MG tablet Take 1 tablet (50 mg) by mouth 2 times daily . Appointment needed for additional refills.     VITAMIN D PO      Current Facility-Administered Medications   Medication     cyanocobalamin injection 1,000 mcg     cyanocobalamin injection 1,000 mcg     Allergies   Allergen Reactions     Seasonal Allergies        ROS:  12 point review of systems negative other than symptoms noted below or in the HPI.  No urinary frequency or dysuria, bladder or kidney problems      OBJECTIVE:     /68   Ht 1.613 m (5' 3.5\")   Wt 66.2 kg (146 lb)   BMI 25.46 kg/m    Body mass index is 25.46 kg/m .    Exam:  Constitutional:  Appearance: Well nourished, well developed alert, in no acute distress  Neurologic:  Mental Status:  Oriented X3.  Normal strength and tone, sensory exam grossly normal, mentation intact and speech normal.    Psychiatric:  Mentation appears normal but flat and monotone as per her recent baseline. Towards end of the appointment did have more minutes of brighter and happier affect and some small laughter     In-Clinic Test Results:  No results found for this or any previous visit (from the past 24 hour(s)).    ASSESSMENT/PLAN:                                                        ICD-10-CM    1. Depression, major, recurrent, moderate (H)  " F33.1    2. Other fatigue  R53.83 **Vitamin B12 FUTURE 2mo     cyanocobalamin injection 1,000 mcg     cyanocobalamin (CYANOCOBALAMIN) 1000 MCG/ML injection     DISCONTINUED: cyanocobalamin (CYANOCOBALAMIN) 1000 MCG/ML injection   3. Low serum vitamin B12  E53.8 Vitamin B12     **Vitamin B12 FUTURE 2mo     cyanocobalamin injection 1,000 mcg     cyanocobalamin (CYANOCOBALAMIN) 1000 MCG/ML injection     DISCONTINUED: cyanocobalamin (CYANOCOBALAMIN) 1000 MCG/ML injection   4. Moderate episode of recurrent major depressive disorder (H)  F33.1 escitalopram (LEXAPRO) 10 MG tablet         Patient did see GI and one blood test ruled out pernicious anemia but she is low on b12 and has been in past.  Since it's been awhile since tested, will repeat level today and proceed with b12 injection. Will then repeat again in 2 weeks and 4 weeks to see what interval of dosing she needs.   In the mean time will await the GI path from biopsies and their recs b/c no records of that available to review today  Unclear how much her fatigue is really related to the b12 but states in past when she's felt really tired this has been helped by b12 shots  In addition she also has a very large life stressor going on with her sister moving who is her closest friend and she teared up talking about it and hasn't teared up in any prior appts  Hard to gauge if her fatigue is really physiologic b/c I would assume it is more depression related and has been made slightly better with lexapro though simultaneously two other issues arose.  Will continue to see her therapist and strongly encouraged her to give her counselor guidelines on what works best for her so her appts are beneficial. Patient is not very open and emotional and may work better with homework and concrete goals and guidelines after each session so she can accomplish them and then feel that this is beneficial in her progress. Patient states she will discuss this with therapist and agrees that  may help  Will let the b12 work and some of the stress of her sister moving, settle down and then return to see me in 2 months  If at that time, or sooner, she feels she is struggling more or isn't improved enough we will increase her lexapro and/or wellbutrin. Could add abilify. Could also switch to an SNRI  If wasn't already losing weight d/t depression could even consider vyvanse or stimulant for fatigue but given her weight loss wouldn't be a good approach at this time.  However if none of those approaches work I may have to refer her to psych as then would not have comfort doing other measures outside of those.  Will need to discuss this with her at next appointment however do have some other alternative options for a period of time  Spent 25 min with the patient, 100% of which was in face to face counseling time.    Muna Ramos MD  [facility]

## 2020-03-09 ENCOUNTER — OFFICE VISIT (OUTPATIENT)
Dept: OBGYN | Facility: CLINIC | Age: 50
End: 2020-03-09
Payer: COMMERCIAL

## 2020-03-09 VITALS
BODY MASS INDEX: 24.92 KG/M2 | SYSTOLIC BLOOD PRESSURE: 106 MMHG | WEIGHT: 146 LBS | DIASTOLIC BLOOD PRESSURE: 68 MMHG | HEIGHT: 64 IN

## 2020-03-09 DIAGNOSIS — F33.1 MODERATE EPISODE OF RECURRENT MAJOR DEPRESSIVE DISORDER (H): ICD-10-CM

## 2020-03-09 DIAGNOSIS — R53.83 OTHER FATIGUE: ICD-10-CM

## 2020-03-09 DIAGNOSIS — F33.1 DEPRESSION, MAJOR, RECURRENT, MODERATE (H): Primary | ICD-10-CM

## 2020-03-09 DIAGNOSIS — E53.8 LOW SERUM VITAMIN B12: ICD-10-CM

## 2020-03-09 PROCEDURE — 36415 COLL VENOUS BLD VENIPUNCTURE: CPT | Performed by: OBSTETRICS & GYNECOLOGY

## 2020-03-09 PROCEDURE — 82607 VITAMIN B-12: CPT | Performed by: OBSTETRICS & GYNECOLOGY

## 2020-03-09 PROCEDURE — 99214 OFFICE O/P EST MOD 30 MIN: CPT | Performed by: OBSTETRICS & GYNECOLOGY

## 2020-03-09 RX ORDER — CYANOCOBALAMIN 1000 UG/ML
1 INJECTION, SOLUTION INTRAMUSCULAR; SUBCUTANEOUS
Qty: 1 ML | Refills: 3 | Status: SHIPPED | OUTPATIENT
Start: 2020-03-09 | End: 2020-03-10

## 2020-03-09 ASSESSMENT — ANXIETY QUESTIONNAIRES
2. NOT BEING ABLE TO STOP OR CONTROL WORRYING: SEVERAL DAYS
1. FEELING NERVOUS, ANXIOUS, OR ON EDGE: SEVERAL DAYS
7. FEELING AFRAID AS IF SOMETHING AWFUL MIGHT HAPPEN: SEVERAL DAYS
GAD7 TOTAL SCORE: 9
5. BEING SO RESTLESS THAT IT IS HARD TO SIT STILL: SEVERAL DAYS
IF YOU CHECKED OFF ANY PROBLEMS ON THIS QUESTIONNAIRE, HOW DIFFICULT HAVE THESE PROBLEMS MADE IT FOR YOU TO DO YOUR WORK, TAKE CARE OF THINGS AT HOME, OR GET ALONG WITH OTHER PEOPLE: SOMEWHAT DIFFICULT
6. BECOMING EASILY ANNOYED OR IRRITABLE: MORE THAN HALF THE DAYS
3. WORRYING TOO MUCH ABOUT DIFFERENT THINGS: SEVERAL DAYS

## 2020-03-09 ASSESSMENT — PATIENT HEALTH QUESTIONNAIRE - PHQ9
5. POOR APPETITE OR OVEREATING: MORE THAN HALF THE DAYS
SUM OF ALL RESPONSES TO PHQ QUESTIONS 1-9: 11

## 2020-03-09 ASSESSMENT — MIFFLIN-ST. JEOR: SCORE: 1259.31

## 2020-03-09 NOTE — NURSING NOTE
Clinic Administered Medication Documentation      Injectable Medication Documentation    Patient was given Cyanocobalamin (B-12). Prior to medication administration, verified patients identity using patient s name and date of birth. Please see MAR and medication order for additional information. Patient instructed to report any adverse reaction to staff immediately  and stay in clinic after the injection but patient declined.      Was entire vial of medication used? Yes  Vial/Syringe: Single dose vial  Expiration Date:  07/2021  Was this medication supplied by the patient? No   Site: Left Deltoid  Yulisa Muñoz CMA on 3/9/2020 at 3:39 PM

## 2020-03-10 LAB — VIT B12 SERPL-MCNC: 211 PG/ML (ref 193–986)

## 2020-03-10 RX ORDER — CYANOCOBALAMIN 1000 UG/ML
1000 INJECTION, SOLUTION INTRAMUSCULAR; SUBCUTANEOUS
Status: DISCONTINUED | OUTPATIENT
Start: 2020-03-10 | End: 2023-07-17

## 2020-03-10 RX ORDER — CYANOCOBALAMIN 1000 UG/ML
1 INJECTION, SOLUTION INTRAMUSCULAR; SUBCUTANEOUS
Qty: 1 ML | Refills: 3 | Status: SHIPPED | OUTPATIENT
Start: 2020-04-06 | End: 2020-10-05

## 2020-03-10 RX ORDER — ESCITALOPRAM OXALATE 10 MG/1
10 TABLET ORAL DAILY
Qty: 90 TABLET | Refills: 0 | Status: SHIPPED | OUTPATIENT
Start: 2020-03-10 | End: 2020-05-11

## 2020-03-10 ASSESSMENT — ANXIETY QUESTIONNAIRES: GAD7 TOTAL SCORE: 9

## 2020-04-02 ENCOUNTER — TRANSFERRED RECORDS (OUTPATIENT)
Dept: HEALTH INFORMATION MANAGEMENT | Facility: CLINIC | Age: 50
End: 2020-04-02

## 2020-04-19 ENCOUNTER — MYC MEDICAL ADVICE (OUTPATIENT)
Dept: OBGYN | Facility: CLINIC | Age: 50
End: 2020-04-19

## 2020-04-20 NOTE — TELEPHONE ENCOUNTER
3/9/20 -  Last Vit B12 injection in office  Will let the b12 work and some of the stress of her sister moving, settle down and then return to see me in 2 months  Normal Vit B12 lab 3/9-  Sent MightyText message instructing to call to schedule lab and injection - orders in place.    Sarah Cedeno RN on 4/20/2020 at 11:53 AM

## 2020-04-21 NOTE — TELEPHONE ENCOUNTER
Dr Ramos was consulted and feels this is non-essential because her labs were normal last check when she received B12 injection. She can reschedule for the beginning of June.    Called and left a detailed vm with above message and sent mychart also.    Sarah Cedeno RN on 4/21/2020 at 10:58 AM

## 2020-04-25 ENCOUNTER — MYC MEDICAL ADVICE (OUTPATIENT)
Dept: OBGYN | Facility: CLINIC | Age: 50
End: 2020-04-25

## 2020-04-27 NOTE — TELEPHONE ENCOUNTER
I guess she could if we can do subcutaneous injection teaching. Issue is she wasn't even technically low on her b12 when we did it last time so she may not even need it and that's why I'd wanted to check a b12 before she did one again. But we could skip that this time and have her do one dose at home and then as soon as we open back up she can come in for a check

## 2020-05-01 NOTE — PROGRESS NOTES
"Ximena Bess is a 50 year old female who is being evaluated via a billable video visit.      The patient has been notified of following:     \"This video visit will be conducted via a call between you and your physician/provider. We have found that certain health care needs can be provided without the need for an in-person physical exam.  This service lets us provide the care you need with a video conversation.  If a prescription is necessary we can send it directly to your pharmacy.  If lab work is needed we can place an order for that and you can then stop by our lab to have the test done at a later time.    Video visits are billed at different rates depending on your insurance coverage.  Please reach out to your insurance provider with any questions.    If during the course of the call the physician/provider feels a video visit is not appropriate, you will not be charged for this service.\"    Patient has given verbal consent for Video visit? Yes    How would you like to obtain your AVS? Mendy    Patient would like the video invitation sent by: Text to cell phone: [phone]    Will anyone else be joining your video visit? No        Video-Visit Details    Type of service:  Video Visit    Video Start Time: 1335  Video End Time: 1355    Originating Location (pt. Location): Home    Distant Location (provider location):  [facility]     Platform used for Video Visit: Sophia Ramos MD          SUBJECTIVE:                                                   Ximena Bess is a 50 year old female who presents to clinic today for the following health issue(s):  Patient presents with:  Recheck Medication: f/u to escitalopram- states dose seems to be fine, was having sleep issues at first but since has resolved    HPI:  Patient is doing a f/u on her depression and anxiety as well as fatigue and b12 deficiency  Has never really had great response to meds for depression/anxiety in the " "past and was never consistent about f/u and taking it consistently enough to find a good combo.    When in last was a week before the pandemic. Had just found out her sister that she's closest to was moving to [place] and was devastated. Has another sister here that she's not as close with and some friends. None of her 3 kdis are in the state and only talking consistently to middle daughter and intermittently to her son who's with her x in [place]. Not talking to her oldest. \"I do really bad with being isolated and lonely\"    Is connecting with family/friends by phone/text and zoom. Is working from home which is great but it was also her social connection to go to work so it's been hard. 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    In the meantime did get a dog. Part latasha and the rest mutt. So that's been nice for company and love and getting herself to go outside and get exercise and fresh air b/c lots of energy in a latasha.    Stopped seeing her therapist. Was already not jiving with her and feeling connected so when the quarantine began it was her excuse not to continue. Hasn't wanted to start with someone else b/c feels so impersonal on video but knows she may need to consider that    Feels like her depression is a bit better on wellbutrin 300mg. Anxiety is still a big issue. Worries and ruminates and overthinks. Started on lexapro and quite slowly was taking only 1/2 tab of the 10mg. Felt it was giving her some insomnia and really weird dreams. Then found out lots were having that in the pandemic so not sure which was which. However slowly did dissiipate and now on 10mg. Definitely a bit better but still a lot of anxiety and worry. No other side effects though.    Did a b12 shot about 6-7 weeks ago. Her b12 that day was actually low normal but prior to that had been low when seen by " GI. Can now tell when her fatigue is b12 related vs just depression and overall fatigue. Now hasn't been able to do that shot and can feel some of the fatigue that sets in with b12 going low so wondering if can just come back in for that so that it doesn't contribute to the depression    No LMP recorded. Patient has had a hysterectomy..     Patient is sexually active, .  Using hysterectomy for contraception.    reports that she has never smoked. She has never used smokeless tobacco.    STD testing offered?  Declined    PHQ 2020   PHQ-9 Total Score 5   Q9: Thoughts of better off dead/self-harm past 2 weeks Not at all     EDVIN-7 SCORE 2020 3/9/2020 2020   Total Score 13 9 8       Health maintenance updated:  yes    Problem list and histories reviewed & adjusted, as indicated.  Additional history: as documented.    Patient Active Problem List   Diagnosis     Moderate episode of recurrent major depressive disorder (H)     Symptoms, such as flushing, sleeplessness, headache, lack of concentration, associated with the menopause     S/P total hysterectomy and bilateral salpingo-oophorectomy     Elevated serum creatinine     Migraine with aura and without status migrainosus, not intractable     Past Surgical History:   Procedure Laterality Date     HYSTERECTOMY VAGINAL, BILATERAL SALPINGO-OOPHERECTOMY, COMBINED  2003     TONSILLECTOMY        Social History     Tobacco Use     Smoking status: Never Smoker     Smokeless tobacco: Never Used   Substance Use Topics     Alcohol use: Yes     Alcohol/week: 0.0 standard drinks      Problem (# of Occurrences) Relation (Name,Age of Onset)    Lactose Intolerance (1) Father (55)    No Known Problems (7) Mother, Sister, Brother, Maternal Grandmother, Maternal Grandfather, Paternal Grandmother, Other            Current Outpatient Medications   Medication Sig     buPROPion (WELLBUTRIN XL) 300 MG 24 hr tablet TAKE 1 TABLET BY MOUTH DAILY EVERY MORNING     cyanocobalamin  (CYANOCOBALAMIN) 1000 MCG/ML injection Inject 1 mL (1,000 mcg) into the muscle every 30 days     cyanocobalamin (CYANOCOBALAMIN) 1000 MCG/ML injection Inject 1 mL (1,000 mcg) into the muscle every 30 days     escitalopram (LEXAPRO) 10 MG tablet Take 1.5-2 tabs po qday (15mg-20mg)     estradiol (VIVELLE-DOT) 0.05 MG/24HR bi-weekly patch PLACE 1 PATCH TOPICALLY ONTO CLEAN SKIN TWICE WEEKLY     Fexofenadine HCl (ALLEGRA PO)      omeprazole (PRILOSEC) 40 MG DR capsule TK 1 C PO QAM     topiramate (TOPAMAX) 50 MG tablet Take 1 tablet (50 mg) by mouth 2 times daily . Appointment needed for additional refills.     VITAMIN D PO      Current Facility-Administered Medications   Medication     cyanocobalamin injection 1,000 mcg     cyanocobalamin injection 1,000 mcg     Allergies   Allergen Reactions     Seasonal Allergies        ROS:  No menstrual cycles      OBJECTIVE:     Exam:  Constitutional:  Appearance: Well nourished, well developed alert, in no acute distress  Neurologic:  Mental Status:  Oriented X3.  mentation intact and speech normal.    Psychiatric:  Mentation appears normal and affect much brighter than last appointment but still a bit more flat overall which is stable from baseline many years now    In-Clinic Test Results:  No results found for this or any previous visit (from the past 24 hour(s)).    ASSESSMENT/PLAN:                                                        ICD-10-CM    1. Anxiety  F41.9    2. Mild recurrent major depression (H)  F33.0    3. Fatigue, unspecified type  R53.83 cyanocobalamin (CYANOCOBALAMIN) 1000 MCG/ML injection   4. B12 deficiency  E53.8 cyanocobalamin (CYANOCOBALAMIN) 1000 MCG/ML injection   5. Moderate episode of recurrent major depressive disorder (H)  F33.1 escitalopram (LEXAPRO) 10 MG tablet         Patient is now doing better on 10mg cheng consistenly being taken with the wellbutrin 300mg but still finding a lot of anxiety that is clearly situational from the pandemic but also  just baseline.  No side effects now but did have insomnia and weird dreams and felt it was lexapro and not wellbutrin related and admittedly could be pandemic related  Will try to increase cheng to 20mg. If sleep disturbance will scale back to 15 mg and cut pills in half. Has at least 2 months of 10mg tabs to try 20mg vs 15mg with. Once finds which one she tolerates best with best effect will let me know and new rx can either be 10mg 1.5 tabs or just for a 20mg tab and taking one of them    debo have patient return for a lab draw for b12 on same day as injection. Though not essential or emergent it is a large component of her depression to have the fatigue slowly ramp up and then affects her sleep, etc.  understands that w/o lab results first she could be totally normal or even high and will have already gotten her shot but unlikely to be dangerous regardless and if that's the case then wouldn't repeat monthly and do longer interval. Already at about 2 months since last injection. Patient agrees to this    telehealth visit in 2 months to f/u on meds and b12.  Encouraged regular exercise, social connectedness in safe ways, outdoors or by video, encouraged to reconsider therapy with a new provider and self cares    Muna Ramos MD  [facility]

## 2020-05-11 ENCOUNTER — VIRTUAL VISIT (OUTPATIENT)
Dept: OBGYN | Facility: CLINIC | Age: 50
End: 2020-05-11
Payer: COMMERCIAL

## 2020-05-11 DIAGNOSIS — E53.8 B12 DEFICIENCY: ICD-10-CM

## 2020-05-11 DIAGNOSIS — F33.0 MILD RECURRENT MAJOR DEPRESSION (H): ICD-10-CM

## 2020-05-11 DIAGNOSIS — F33.1 MODERATE EPISODE OF RECURRENT MAJOR DEPRESSIVE DISORDER (H): ICD-10-CM

## 2020-05-11 DIAGNOSIS — R53.83 FATIGUE, UNSPECIFIED TYPE: ICD-10-CM

## 2020-05-11 DIAGNOSIS — F41.9 ANXIETY: Primary | ICD-10-CM

## 2020-05-11 PROCEDURE — 99213 OFFICE O/P EST LOW 20 MIN: CPT | Mod: GT | Performed by: OBSTETRICS & GYNECOLOGY

## 2020-05-11 RX ORDER — ESCITALOPRAM OXALATE 10 MG/1
TABLET ORAL
Qty: 90 TABLET | Refills: 0 | Status: SHIPPED | OUTPATIENT
Start: 2020-05-11 | End: 2020-08-03

## 2020-05-11 RX ORDER — CYANOCOBALAMIN 1000 UG/ML
1 INJECTION, SOLUTION INTRAMUSCULAR; SUBCUTANEOUS
Qty: 1 ML | Refills: 3
Start: 2020-05-11 | End: 2020-10-08

## 2020-05-11 ASSESSMENT — ANXIETY QUESTIONNAIRES
6. BECOMING EASILY ANNOYED OR IRRITABLE: NOT AT ALL
GAD7 TOTAL SCORE: 8
7. FEELING AFRAID AS IF SOMETHING AWFUL MIGHT HAPPEN: MORE THAN HALF THE DAYS
5. BEING SO RESTLESS THAT IT IS HARD TO SIT STILL: NOT AT ALL
3. WORRYING TOO MUCH ABOUT DIFFERENT THINGS: SEVERAL DAYS
2. NOT BEING ABLE TO STOP OR CONTROL WORRYING: MORE THAN HALF THE DAYS
IF YOU CHECKED OFF ANY PROBLEMS ON THIS QUESTIONNAIRE, HOW DIFFICULT HAVE THESE PROBLEMS MADE IT FOR YOU TO DO YOUR WORK, TAKE CARE OF THINGS AT HOME, OR GET ALONG WITH OTHER PEOPLE: SOMEWHAT DIFFICULT
1. FEELING NERVOUS, ANXIOUS, OR ON EDGE: MORE THAN HALF THE DAYS

## 2020-05-11 ASSESSMENT — PATIENT HEALTH QUESTIONNAIRE - PHQ9
SUM OF ALL RESPONSES TO PHQ QUESTIONS 1-9: 5
5. POOR APPETITE OR OVEREATING: SEVERAL DAYS

## 2020-05-12 ASSESSMENT — ANXIETY QUESTIONNAIRES: GAD7 TOTAL SCORE: 8

## 2020-05-15 ENCOUNTER — TELEPHONE (OUTPATIENT)
Dept: OBGYN | Facility: CLINIC | Age: 50
End: 2020-05-15

## 2020-05-15 ENCOUNTER — ALLIED HEALTH/NURSE VISIT (OUTPATIENT)
Dept: NURSING | Facility: CLINIC | Age: 50
End: 2020-05-15
Payer: COMMERCIAL

## 2020-05-15 DIAGNOSIS — R53.83 OTHER FATIGUE: ICD-10-CM

## 2020-05-15 DIAGNOSIS — E53.8 LOW SERUM VITAMIN B12: ICD-10-CM

## 2020-05-15 LAB — VIT B12 SERPL-MCNC: 691 PG/ML (ref 193–986)

## 2020-05-15 PROCEDURE — 82607 VITAMIN B-12: CPT | Performed by: OBSTETRICS & GYNECOLOGY

## 2020-05-15 PROCEDURE — 99207 ZZC NO CHARGE NURSE ONLY: CPT

## 2020-05-15 PROCEDURE — 96372 THER/PROPH/DIAG INJ SC/IM: CPT

## 2020-05-15 PROCEDURE — 36415 COLL VENOUS BLD VENIPUNCTURE: CPT | Performed by: OBSTETRICS & GYNECOLOGY

## 2020-05-15 RX ADMIN — CYANOCOBALAMIN 1000 MCG: 1000 INJECTION, SOLUTION INTRAMUSCULAR; SUBCUTANEOUS at 14:21

## 2020-05-15 NOTE — TELEPHONE ENCOUNTER
Prior Authorization Retail Medication Request    Medication/Dose:   escitalopram (LEXAPRO) 10 MG tablet  90 tablet  0  5/11/2020      Sig: Take 1.5-2 tabs po qday (15mg-20mg)      ICD code :Moderate episode of recurrent major depressive disorder (H) [F33.1]    Previously Tried and Failed:  Wellbutrin, Effexor  Rationale:  Per Virtual Visit 5/11/20    Will try to increase cheng to 20mg. If sleep disturbance will scale back to 15 mg and cut pills in half. Has at least 2 months of 10mg tabs to try 20mg vs 15mg with. Once finds which one she tolerates best with best effect will let me know and new rx can either be 10mg 1.5 tabs or just for a 20mg tab and taking one of them    Insurance Name:  [employer]  Insurance ID:  [id number]

## 2020-05-15 NOTE — PROGRESS NOTES
Clinic Administered Medication Documentation      Injectable Medication Documentation    Patient was given Cyanocobalamin (B-12). Prior to medication administration, verified patients identity using patient s name and date of birth. Please see MAR and medication order for additional information. Patient instructed to report any adverse reaction to staff immediately .      Was entire vial of medication used? Yes  Vial/Syringe: Single dose vial  Expiration Date:  07/2021  Was this medication supplied by the patient? No   Site: Right Deltoid  Yulisa Muñoz CMA on 5/15/2020 at 2:20 PM

## 2020-05-15 NOTE — TELEPHONE ENCOUNTER
PA Initiation    Medication: escitalopram (LEXAPRO) 10 MG tablet   Insurance Company: [employer] - Phone [phone] Fax [phone]  Pharmacy Filling the Rx: [id number] [place], [place] - [address] [place] [facility] [address]  Filling Pharmacy Phone: [phone]  Filling Pharmacy Fax:    Start Date: 5/15/2020    Central Prior Authorization Team   Phone: [phone]      Filled out form and faxed it to [employer] at fax# [phone]

## 2020-05-18 NOTE — TELEPHONE ENCOUNTER
Prior Authorization Approval    Authorization Effective Date: 5/15/2020  Authorization Expiration Date: 5/15/2021  Medication: escitalopram (LEXAPRO) 10 MG tablet   Approved Dose/Quantity: 90  Reference #:     Insurance Company: [employer] - Phone [phone] Fax [phone]  Which Pharmacy is filling the prescription (Not needed for infusion/clinic administered): [id number] [address] AT [facility] [address]  Pharmacy Notified: Yes  Patient Notified:  **Instructed pharmacy to notify patient when script is ready to /ship.**

## 2020-08-03 ENCOUNTER — MYC REFILL (OUTPATIENT)
Dept: OBGYN | Facility: CLINIC | Age: 50
End: 2020-08-03

## 2020-08-03 DIAGNOSIS — F33.1 MODERATE EPISODE OF RECURRENT MAJOR DEPRESSIVE DISORDER (H): ICD-10-CM

## 2020-08-03 RX ORDER — ESCITALOPRAM OXALATE 10 MG/1
TABLET ORAL
Qty: 30 TABLET | Refills: 0 | Status: SHIPPED | OUTPATIENT
Start: 2020-08-03 | End: 2020-10-06

## 2020-08-03 NOTE — TELEPHONE ENCOUNTER
"Requested Prescriptions   Pending Prescriptions Disp Refills     escitalopram (LEXAPRO) 10 MG tablet 90 tablet 0     Sig: Take 1.5-2 tabs po qday (15mg-20mg)       SSRIs Protocol Failed - 8/3/2020 12:27 PM        Failed - PHQ-9 score less than 5 in past 6 months     Please review last PHQ-9 score.           Passed - Medication is active on med list        Passed - Patient is age 18 or older        Passed - No active pregnancy on record        Passed - No positive pregnancy test in last 12 months        Passed - Recent (6 mo) or future (30 days) visit within the authorizing provider's specialty     Patient had office visit in the last 6 months or has a visit in the next 30 days with authorizing provider or within the authorizing provider's specialty.  See \"Patient Info\" tab in inbasket, or \"Choose Columns\" in Meds & Orders section of the refill encounter.               One month given  Appointment needed for further refills  Mindi Jennings RN on 8/3/2020 at 12:31 PM    "

## 2020-08-10 DIAGNOSIS — E53.8 B12 DEFICIENCY: Primary | ICD-10-CM

## 2020-08-12 DIAGNOSIS — E53.8 B12 DEFICIENCY: ICD-10-CM

## 2020-08-12 LAB — VIT B12 SERPL-MCNC: 400 PG/ML (ref 193–986)

## 2020-08-12 PROCEDURE — 82607 VITAMIN B-12: CPT | Performed by: OBSTETRICS & GYNECOLOGY

## 2020-08-12 PROCEDURE — 36415 COLL VENOUS BLD VENIPUNCTURE: CPT | Performed by: OBSTETRICS & GYNECOLOGY

## 2020-08-13 ENCOUNTER — E-VISIT (OUTPATIENT)
Dept: OBGYN | Facility: CLINIC | Age: 50
End: 2020-08-13
Payer: COMMERCIAL

## 2020-08-13 DIAGNOSIS — E53.8 VITAMIN B12 DEFICIENCY (NON ANEMIC): ICD-10-CM

## 2020-08-13 DIAGNOSIS — R53.82 CHRONIC FATIGUE: Primary | ICD-10-CM

## 2020-08-13 PROCEDURE — 99207 ZZC NO BILLABLE SERVICE THIS VISIT: CPT | Performed by: OBSTETRICS & GYNECOLOGY

## 2020-08-14 RX ORDER — CYANOCOBALAMIN 1000 UG/ML
100 INJECTION, SOLUTION INTRAMUSCULAR; SUBCUTANEOUS
Status: ACTIVE | OUTPATIENT
Start: 2020-08-14 | End: 2021-08-09

## 2020-08-26 ENCOUNTER — TELEPHONE (OUTPATIENT)
Dept: OBGYN | Facility: CLINIC | Age: 50
End: 2020-08-26

## 2020-08-26 DIAGNOSIS — E53.8 VITAMIN B12 DEFICIENCY (NON ANEMIC): Primary | ICD-10-CM

## 2020-08-26 NOTE — TELEPHONE ENCOUNTER
----- Message from Muna Ramos MD sent at 8/24/2020  9:39 AM CDT -----  She responded back that she is feeling like it's low despite her normal level and wants to come in so she can come anytime    AJ  ----- Message -----  From: Yulisa Muñoz CMA  Sent: 8/24/2020   8:41 AM CDT  To: Muna Ramos MD    When would you like her to come in? You sent Ximena a message with er B12 levels saying that she is good for a while.    Yulisa Terrell        ----- Message -----  From: Muna Ramos MD  Sent: 8/14/2020   6:44 AM CDT  To: We Ma Pool    Can we call the patient and say that she can come in for a b12 injection, that it's fine with me.  Just needs to make an appointment for it    AJ

## 2020-09-03 RX ORDER — CYANOCOBALAMIN 1000 UG/ML
1000 INJECTION, SOLUTION INTRAMUSCULAR; SUBCUTANEOUS ONCE
Status: COMPLETED | OUTPATIENT
Start: 2020-09-04 | End: 2020-09-04

## 2020-09-04 ENCOUNTER — ALLIED HEALTH/NURSE VISIT (OUTPATIENT)
Dept: NURSING | Facility: CLINIC | Age: 50
End: 2020-09-04
Payer: COMMERCIAL

## 2020-09-04 DIAGNOSIS — E53.8 LOW SERUM VITAMIN B12: Primary | ICD-10-CM

## 2020-09-04 PROCEDURE — 96372 THER/PROPH/DIAG INJ SC/IM: CPT

## 2020-09-04 RX ADMIN — CYANOCOBALAMIN 1000 MCG: 1000 INJECTION, SOLUTION INTRAMUSCULAR; SUBCUTANEOUS at 11:22

## 2020-09-04 NOTE — PROGRESS NOTES
Clinic Administered Medication Documentation      Injectable Medication Documentation    Patient was given Cyanocobalamin (B-12). Prior to medication administration, verified patients identity using patient s name and date of birth. Please see MAR and medication order for additional information. Patient instructed to report any adverse reaction to staff immediately .      Was entire vial of medication used? Yes  Vial/Syringe: Single dose vial  Expiration Date:  07/2021  Was this medication supplied by the patient? No   Yulisa Muñoz CMA on 9/4/2020 at 11:24 AM

## 2020-10-05 ENCOUNTER — MYC REFILL (OUTPATIENT)
Dept: OBGYN | Facility: CLINIC | Age: 50
End: 2020-10-05

## 2020-10-05 DIAGNOSIS — F33.1 MODERATE EPISODE OF RECURRENT MAJOR DEPRESSIVE DISORDER (H): ICD-10-CM

## 2020-10-05 DIAGNOSIS — R53.83 OTHER FATIGUE: ICD-10-CM

## 2020-10-05 DIAGNOSIS — E53.8 LOW SERUM VITAMIN B12: ICD-10-CM

## 2020-10-05 RX ORDER — ESCITALOPRAM OXALATE 10 MG/1
TABLET ORAL
Qty: 30 TABLET | Refills: 3 | OUTPATIENT
Start: 2020-10-05

## 2020-10-05 RX ORDER — ESCITALOPRAM OXALATE 20 MG/1
20 TABLET ORAL DAILY
Qty: 30 TABLET | Refills: 3 | Status: SHIPPED | OUTPATIENT
Start: 2020-10-05 | End: 2020-10-06

## 2020-10-05 NOTE — TELEPHONE ENCOUNTER
"Requested Prescriptions   Pending Prescriptions Disp Refills     escitalopram (LEXAPRO) 10 MG tablet 30 tablet 0     Sig: Take 1.5-2 tabs po qday (15mg-20mg)       SSRIs Protocol Failed - 10/5/2020  1:48 PM        Failed - PHQ-9 score less than 5 in past 6 months     Please review last PHQ-9 score.           Passed - Medication is active on med list        Passed - Patient is age 18 or older        Passed - No active pregnancy on record        Passed - No positive pregnancy test in last 12 months        Passed - Recent (6 mo) or future (30 days) visit within the authorizing provider's specialty     Patient had office visit in the last 6 months or has a visit in the next 30 days with authorizing provider or within the authorizing provider's specialty.  See \"Patient Info\" tab in inbasket, or \"Choose Columns\" in Meds & Orders section of the refill encounter.               Last Written Prescription Date:  8/3/20  Last Fill Quantity: 30,  # refills: 0   Last office visit: 3/9/2020 with prescribing provider:  Rachel   Future Office Visit:    Patient is now doing better on 10mg cheng consistenly being taken with the wellbutrin 300mg but still finding a lot of anxiety that is clearly situational from the pandemic but also just baseline.  No side effects now but did have insomnia and weird dreams and felt it was lexapro and not wellbutrin related and admittedly could be pandemic related  Will try to increase cheng to 20mg. If sleep disturbance will scale back to 15 mg and cut pills in half. Has at least 2 months of 10mg tabs to try 20mg vs 15mg with. Once finds which one she tolerates best with best effect will let me know and new rx can either be 10mg 1.5 tabs or just for a 20mg tab and taking one of them       Patient called.  She is consistently taking the 20 mg daily and feeling this is right dose for her at this time.  Prescription approved per [facility] Refill Protocol.    Ruthy Morales RN on 10/5/2020 at 2:01 PM        "

## 2020-10-05 NOTE — TELEPHONE ENCOUNTER
"Requested Prescriptions   Pending Prescriptions Disp Refills     cyanocobalamin (CYANOCOBALAMIN) 1000 MCG/ML injection 1 mL 3     Sig: Inject 1 mL (1,000 mcg) into the muscle every 30 days       Vitamin Supplements (Adult) Protocol Passed - 10/5/2020  1:51 PM        Passed - High dose Vitamin D not ordered        Passed - Recent (12 mo) or future (30 days) visit within the authorizing provider's specialty     Patient has had an office visit with the authorizing provider or a provider within the authorizing providers department within the previous 12 mos or has a future within next 30 days. See \"Patient Info\" tab in inbasket, or \"Choose Columns\" in Meds & Orders section of the refill encounter.              Passed - Medication is active on med list           Last Written Prescription Date:  5/11/20  Last Fill Quantity: 1ml,  # refills: 3   Last office visit: 3/9/2020 with prescribing provider:  Rachel   Future Office Visit:    Pharmacy called.  No rx currently on file.  Patient called. Said Dr. Ramos and her  have talked about doing injections herself, and states she feels that at this time she can begin to do that.    Routing to provider to advise. Unsure of refills    Ruthy Morales RN on 10/5/2020 at 2:25 PM         "

## 2020-10-06 ENCOUNTER — VIRTUAL VISIT (OUTPATIENT)
Dept: FAMILY MEDICINE | Facility: OTHER | Age: 50
End: 2020-10-06
Payer: COMMERCIAL

## 2020-10-06 DIAGNOSIS — F33.1 MODERATE EPISODE OF RECURRENT MAJOR DEPRESSIVE DISORDER (H): ICD-10-CM

## 2020-10-06 PROCEDURE — 99421 OL DIG E/M SVC 5-10 MIN: CPT | Performed by: FAMILY MEDICINE

## 2020-10-06 RX ORDER — ESCITALOPRAM OXALATE 10 MG/1
TABLET ORAL
Qty: 30 TABLET | Refills: 0 | Status: CANCELLED | OUTPATIENT
Start: 2020-10-06

## 2020-10-06 RX ORDER — ESCITALOPRAM OXALATE 20 MG/1
20 TABLET ORAL DAILY
Qty: 30 TABLET | Refills: 3 | Status: SHIPPED | OUTPATIENT
Start: 2020-10-06 | End: 2020-10-08

## 2020-10-06 RX ORDER — CYANOCOBALAMIN 1000 UG/ML
1 INJECTION, SOLUTION INTRAMUSCULAR; SUBCUTANEOUS
Qty: 1 ML | Refills: 3 | Status: SHIPPED | OUTPATIENT
Start: 2020-10-06 | End: 2020-10-08

## 2020-10-06 NOTE — TELEPHONE ENCOUNTER
"Requested Prescriptions   Pending Prescriptions Disp Refills     escitalopram (LEXAPRO) 10 MG tablet 30 tablet 0     Sig: Take 1.5-2 tabs po qday (15mg-20mg)       SSRIs Protocol Failed - 10/6/2020 11:41 AM        Failed - PHQ-9 score less than 5 in past 6 months     Please review last PHQ-9 score.           Passed - Medication is active on med list        Passed - Patient is age 18 or older        Passed - No active pregnancy on record        Passed - No positive pregnancy test in last 12 months        Passed - Recent (6 mo) or future (30 days) visit within the authorizing provider's specialty     Patient had office visit in the last 6 months or has a visit in the next 30 days with authorizing provider or within the authorizing provider's specialty.  See \"Patient Info\" tab in inbasket, or \"Choose Columns\" in Meds & Orders section of the refill encounter.               Last Written Prescription Date:  10/05/2020  Last Fill Quantity: 30,  # refills: 3   Last office visit: 3/9/2020 with prescribing provider:  Dr. Ramos   Future Office Visit:        Medication was sent to compounding pharmacy that is unable to dispense. Unsure of which pharmacy she would like this sent to.  Jacquie Murry MA on 10/6/2020 at 11:42 AM    "

## 2020-10-06 NOTE — TELEPHONE ENCOUNTER
Prescription approved per [facility] Refill Protocol.  Sent to [facility] on  [address] as before  Ruthy Morales RN on 10/6/2020 at 12:19 PM

## 2020-10-06 NOTE — PROGRESS NOTES
"Date: 10/06/2020 13:57:41  Clinician: Jorge Gallagher  Clinician NPI: [id number]  Patient: Ximena Bess  Patient : 1970  Patient Address: [address] [place] [address], [place] [address]  Patient Phone: [phone]  Visit Protocol: URI  Patient Summary:  Ximena is a 50 year old ( : 1970 ) female who initiated a [facility] Visit for cold, sinus infection, or influenza. When asked the question \"Please sign me up to receive news, health information and promotions from [facility].\", Ximena responded \"Yes\".   A synchronous visit is necessary because the patient reported the following abnormal symptoms:   Bloody mucus (requires clarification)   Ximena states her symptoms started gradually 3-4 days ago.   Her symptoms consist of ear pain, a headache, enlarged lymph nodes, nasal congestion, rhinitis, nausea, facial pain or pressure, myalgia, chills, malaise, and diarrhea.   Symptom details     Nasal secretions: The color of her mucus is blood-tinged, bloody, clear, and yellow.    Facial pain or pressure: The facial pain or pressure feels worse when bending over or leaning forward.     Headache: She states the headache is mild (1-3 on a 10 point pain scale).      Ximena denies having wheezing, fever, cough, anosmia, vomiting, sore throat, teeth pain, and ageusia. She also denies double sickening (worsening symptoms after initial improvement), having a sinus infection within the past year, taking antibiotic medication in the past month, and having recent facial or sinus surgery in the past 60 days. She is not experiencing dyspnea.   Precipitating events  She has not recently been exposed to someone with influenza. Ximena has not been in close contact with any high risk individuals.   Pertinent COVID-19 (Coronavirus) information  In the past 14 days, Ximena has not worked in a congregate living setting.   She does not work or volunteer as healthcare worker or a  and does not work or volunteer in a " healthcare facility.   Ximena also has not lived in a congregate living setting in the past 14 days. She does not live with a healthcare worker.   Ximena has not had a close contact with a laboratory-confirmed COVID-19 patient within 14 days of symptom onset.   Since December 2019, Ximena and has had upper respiratory infection (URI) or influenza-like illness. Has not been diagnosed with lab-confirmed COVID-19 test      Date(s) of previous URI or influenza-like illness (free-text): 2018     Symptoms Ximena experienced during previous URI or influenza-like illness as reported by the patient (free-text): Ear and face pain, tired, blood when I blow my nose, achy and headache        Pertinent medical history  Ximena does not get yeast infections when she takes antibiotics.   Ximena needs a return to work/school note.   Weight: 150 lbs   Ximena does not smoke or use smokeless tobacco.   Weight: 150 lbs    MEDICATIONS: topiramate oral, Allegra-D 24 Hour oral, Lexapro oral, ALLERGIES: topiramate, escitalopram, bupropion  Clinician Response:  Dear Ximena,   Your symptoms show that you may have coronavirus (COVID-19). This illness can cause fever, cough and trouble breathing. Many people get a mild case and get better on their own. Some people can get very sick.  What should I do?  We would like to test you for this virus.   1. Please call [phone] to schedule your visit. Explain that you were referred by [facility] to have a COVID-19 test. Be ready to share [facility].  The following will serve as your written order for this COVID Test, ordered by me, for the indication of suspected COVID [Z20.828]: The test will be ordered in [employer], our electronic health record, after you are scheduled. It will show as ordered and authorized by Kwabena Vaz MD.  Order: COVID-19 (Coronavirus) PCR for SYMPTOMATIC testing from [facility].      2. When it's time for your COVID test:  Stay at least 6 feet away from others.  "(If someone will drive you to your test, stay in the backseat, as far away from the  as you can.)   Cover your mouth and nose with a mask, tissue or washcloth.  Go straight to the testing site. Don't make any stops on the way there or back.      3.Starting now: Stay home and away from others (self-isolate) until:   You've had no fever---and no medicine that reduces fever---for one full day (24 hours). And...   Your other symptoms have gotten better. For example, your cough or breathing has improved. And...   At least 10 days have passed since your symptoms started.       During this time, don't leave the house except for testing or medical care.   Stay in your own room, even for meals. Use your own bathroom if you can.   Stay away from others in your home. No hugging, kissing or shaking hands. No visitors.  Don't go to work, school or anywhere else.    Clean \"high touch\" surfaces often (doorknobs, counters, handles, etc.). Use a household cleaning spray or wipes. You'll find a full list of  on the EPA website: www.epa.gov/pesticide-registration/list-n-disinfectants-use-against-sars-cov-2.   Cover your mouth and nose with a mask, tissue or washcloth to avoid spreading germs.  Wash your hands and face often. Use soap and water.  Caregivers in these groups are at risk for severe illness due to COVID-19:  o People 65 years and older  o People who live in a nursing home or long-term care facility  o People with chronic disease (lung, heart, cancer, diabetes, kidney, liver, immunologic)  o People who have a weakened immune system, including those who:   Are in cancer treatment  Take medicine that weakens the immune system, such as corticosteroids  Had a bone marrow or organ transplant  Have an immune deficiency  Have poorly controlled HIV or AIDS  Are obese (body mass index of 40 or higher)  Smoke regularly   o Caregivers should wear gloves while washing dishes, handling laundry and cleaning bedrooms and " bathrooms.  o Use caution when washing and drying laundry: Don't shake dirty laundry, and use the warmest water setting that you can.  o For more tips, go to www.cdc.gov/coronavirus/2019-ncov/downloads/10Things.pdf.    4.Sign up for [facility] [employer]. We know it's scary to hear that you might have COVID-19. We want to track your symptoms to make sure you're okay over the next 2 weeks. Please look for an email from [employer]---this is a free, online program that we'll use to keep in touch. To sign up, follow the link in the email. Learn more at [email or web address]  How can I take care of myself?   Get lots of rest. Drink extra fluids (unless a doctor has told you not to).   Take Tylenol (acetaminophen) for fever or pain. If you have liver or kidney problems, ask your family doctor if it's okay to take Tylenol.   Adults can take either:    650 mg (two 325 mg pills) every 4 to 6 hours, or...   1,000 mg (two 500 mg pills) every 8 hours as needed.    Note: Don't take more than 3,000 mg in one day. Acetaminophen is found in many medicines (both prescribed and over-the-counter medicines). Read all labels to be sure you don't take too much.   For children, check the Tylenol bottle for the right dose. The dose is based on the child's age or weight.    If you have other health problems (like cancer, heart failure, an organ transplant or severe kidney disease): Call your specialty clinic if you don't feel better in the next 2 days.       Know when to call 911. Emergency warning signs include:    Trouble breathing or shortness of breath Pain or pressure in the chest that doesn't go away Feeling confused like you haven't felt before, or not being able to wake up Bluish-colored lips or face.  Where can I get more information?    [facility] -- About COVID-19: [email or web address]   CDC -- What to Do If You're Sick: www.cdc.gov/coronavirus/2019-ncov/about/steps-when-sick.html   CDC -- Ending Home  Isolation: www.cdc.gov/coronavirus/2019-ncov/hcp/disposition-in-home-patients.html   CDC -- Caring for Someone: www.cdc.gov/coronavirus/2019-ncov/if-you-are-sick/care-for-someone.html   [facility] -- Interim Guidance for Hospital Discharge to Home: www.health.[facility].[place]./diseases/coronavirus/hcp/hospdischarge.pdf   [facility] clinical trials (COVID-19 research studies): clinicalaffairs.[facility]/n-clinical-trials    Below are the COVID-19 hotlines at the [place] Department of Health ([facility]). Interpreters are available.    For health questions: Call 027-397-4086 or 1-874.132.2066 (7 a.m. to 7 p.m.) For questions about schools and childcare: Call 853-098-6387 or 1-181.275.7160 (7 a.m. to 7 p.m.)    Diagnosis: Acute upper respiratory infection, unspecified  Diagnosis ICD: J06.9  Triage Notes: I reviewed the patient's history, verified their identity, and explained the [facility] Visit process.    pt with sinus and ear symptoms  Synchronous Triage: phone, status: completed, duration: 131 seconds

## 2020-10-07 ENCOUNTER — MYC REFILL (OUTPATIENT)
Dept: OBGYN | Facility: CLINIC | Age: 50
End: 2020-10-07

## 2020-10-07 DIAGNOSIS — Z20.822 SUSPECTED 2019 NOVEL CORONAVIRUS INFECTION: ICD-10-CM

## 2020-10-07 DIAGNOSIS — Z20.822 SUSPECTED 2019 NOVEL CORONAVIRUS INFECTION: Primary | ICD-10-CM

## 2020-10-07 DIAGNOSIS — G43.109 MIGRAINE WITH AURA AND WITHOUT STATUS MIGRAINOSUS, NOT INTRACTABLE: ICD-10-CM

## 2020-10-07 LAB
SARS-COV-2 RNA SPEC QL NAA+PROBE: NOT DETECTED
SPECIMEN SOURCE: NORMAL

## 2020-10-07 PROCEDURE — U0003 INFECTIOUS AGENT DETECTION BY NUCLEIC ACID (DNA OR RNA); SEVERE ACUTE RESPIRATORY SYNDROME CORONAVIRUS 2 (SARS-COV-2) (CORONAVIRUS DISEASE [COVID-19]), AMPLIFIED PROBE TECHNIQUE, MAKING USE OF HIGH THROUGHPUT TECHNOLOGIES AS DESCRIBED BY CMS-2020-01-R: HCPCS | Performed by: FAMILY MEDICINE

## 2020-10-07 PROCEDURE — 99207 PR NO CHARGE LOS: CPT

## 2020-10-07 NOTE — ADDENDUM NOTE
Addended by: KATHLEEN DELUCA on: 10/7/2020 04:29 PM     Modules accepted: Level of Service, SmartSet

## 2020-10-08 ENCOUNTER — MYC REFILL (OUTPATIENT)
Dept: OBGYN | Facility: CLINIC | Age: 50
End: 2020-10-08

## 2020-10-08 DIAGNOSIS — E53.8 LOW SERUM VITAMIN B12: ICD-10-CM

## 2020-10-08 DIAGNOSIS — F33.1 MODERATE EPISODE OF RECURRENT MAJOR DEPRESSIVE DISORDER (H): ICD-10-CM

## 2020-10-08 DIAGNOSIS — R53.83 OTHER FATIGUE: ICD-10-CM

## 2020-10-08 RX ORDER — CYANOCOBALAMIN 1000 UG/ML
INJECTION, SOLUTION INTRAMUSCULAR; SUBCUTANEOUS
Qty: 3 ML | OUTPATIENT
Start: 2020-10-08

## 2020-10-08 RX ORDER — ESCITALOPRAM OXALATE 20 MG/1
20 TABLET ORAL DAILY
Qty: 90 TABLET | Refills: 0 | Status: SHIPPED | OUTPATIENT
Start: 2020-10-08 | End: 2021-01-22

## 2020-10-08 RX ORDER — CYANOCOBALAMIN 1000 UG/ML
1 INJECTION, SOLUTION INTRAMUSCULAR; SUBCUTANEOUS
Qty: 3 ML | Refills: 0 | Status: SHIPPED | OUTPATIENT
Start: 2020-10-08 | End: 2021-01-22

## 2020-10-08 RX ORDER — TOPIRAMATE 50 MG/1
50 TABLET, FILM COATED ORAL 2 TIMES DAILY
Qty: 180 TABLET | Refills: 0 | Status: SHIPPED | OUTPATIENT
Start: 2020-10-08 | End: 2021-01-22

## 2020-10-08 NOTE — TELEPHONE ENCOUNTER
"Requested Prescriptions   Pending Prescriptions Disp Refills     topiramate (TOPAMAX) 50 MG tablet 90 tablet 3     Sig: Take 1 tablet (50 mg) by mouth 2 times daily . Appointment needed for additional refills.       Anti-Seizure Meds Protocol  Failed - 10/7/2020  7:03 PM        Failed - Review Authorizing provider's last note.      Refer to last progress notes: confirm request is for original authorizing provider (cannot be through other providers).          Failed - Normal CBC on file in past 26 months     No lab results found.              Failed - Normal platelet count on file in past 26 months     No lab results found.            Passed - Recent (12 mo) or future (30 days) visit within the authorizing provider's specialty     Patient has had an office visit with the authorizing provider or a provider within the authorizing providers department within the previous 12 mos or has a future within next 30 days. See \"Patient Info\" tab in inbasket, or \"Choose Columns\" in Meds & Orders section of the refill encounter.              Passed - Normal ALT or AST on file in past 26 months     Recent Labs   Lab Test 01/20/20  1140   ALT 22     Recent Labs   Lab Test 01/20/20  1140   AST 18             Passed - Medication is active on med list        Passed - No active pregnancy on record        Passed - No positive pregnancy test in last 12 months           Last Written Prescription Date:  01/20/2020  Last Fill Quantity: 90,  # refills: 3   Last office visit: 3/9/2020 with prescribing provider:  Rachel   Future Office Visit:      [facility] remainder of [facility].  Annual exam due 1/2021.    Prescription approved per [facility] Refill Protocol.    Jennifer Gan RN on 10/8/2020 at 8:33 AM              "

## 2020-10-08 NOTE — TELEPHONE ENCOUNTER
Rx's transferred to mail order pharmacy for 3 month supply.    Sarah Cedeno RN on 10/8/2020 at 11:47 AM

## 2020-10-08 NOTE — TELEPHONE ENCOUNTER
Rx refill sent to mail order in [facility] refill encounter.  Sarah Cedeno RN on 10/8/2020 at 11:49 AM

## 2020-10-09 DIAGNOSIS — G43.109 MIGRAINE WITH AURA AND WITHOUT STATUS MIGRAINOSUS, NOT INTRACTABLE: ICD-10-CM

## 2020-10-09 DIAGNOSIS — F33.1 MODERATE EPISODE OF RECURRENT MAJOR DEPRESSIVE DISORDER (H): ICD-10-CM

## 2020-10-09 RX ORDER — ESCITALOPRAM OXALATE 20 MG/1
20 TABLET ORAL DAILY
Qty: 90 TABLET | Refills: 0 | OUTPATIENT
Start: 2020-10-09

## 2020-10-09 RX ORDER — TOPIRAMATE 50 MG/1
TABLET, FILM COATED ORAL
Qty: 180 TABLET | Refills: 0 | OUTPATIENT
Start: 2020-10-09

## 2020-10-09 NOTE — TELEPHONE ENCOUNTER
"Requested Prescriptions   Pending Prescriptions Disp Refills     escitalopram (LEXAPRO) 20 MG tablet 90 tablet 0     Sig: Take 1 tablet (20 mg) by mouth daily       SSRIs Protocol Failed - 10/9/2020  3:05 PM        Failed - PHQ-9 score less than 5 in past 6 months     Please review last PHQ-9 score.           Passed - Medication is active on med list        Passed - Patient is age 18 or older        Passed - No active pregnancy on record        Passed - No positive pregnancy test in last 12 months        Passed - Recent (6 mo) or future (30 days) visit within the authorizing provider's specialty     Patient had office visit in the last 6 months or has a visit in the next 30 days with authorizing provider or within the authorizing provider's specialty.  See \"Patient Info\" tab in inbasket, or \"Choose Columns\" in Meds & Orders section of the refill encounter.               Last Written Prescription Date:  10/08/2020  Last Fill Quantity: 90,  # refills: 0   Last office visit: 3/9/2020 with prescribing provider:  Dr. Ramos   Future Office Visit:      Pharmacy states she currently was prescribed 0 refills. Pharmacy is asking if it is okay to dispense 3 refills?  Jacquie Murry MA on 10/9/2020 at 3:07 PM          "

## 2020-10-09 NOTE — TELEPHONE ENCOUNTER
"Requested Prescriptions   Pending Prescriptions Disp Refills     topiramate (TOPAMAX) 50 MG tablet [Pharmacy Med Name: TOPIRAMATE 50MG TABLETS] 180 tablet 0     Sig: TAKE ONE TABLET BY MOUTH TWO TIMES DAILY       Anti-Seizure Meds Protocol  Failed - 10/9/2020  8:24 AM        Failed - Review Authorizing provider's last note.      Refer to last progress notes: confirm request is for original authorizing provider (cannot be through other providers).          Failed - Normal CBC on file in past 26 months     No lab results found.              Failed - Normal platelet count on file in past 26 months     No lab results found.            Passed - Recent (12 mo) or future (30 days) visit within the authorizing provider's specialty     Patient has had an office visit with the authorizing provider or a provider within the authorizing providers department within the previous 12 mos or has a future within next 30 days. See \"Patient Info\" tab in inbasket, or \"Choose Columns\" in Meds & Orders section of the refill encounter.              Passed - Normal ALT or AST on file in past 26 months     Recent Labs   Lab Test 01/20/20  1140   ALT 22     Recent Labs   Lab Test 01/20/20  1140   AST 18             Passed - Medication is active on med list        Passed - No active pregnancy on record        Passed - No positive pregnancy test in last 12 months           Last Written Prescription Date:  10/08/2020  Last Fill Quantity: 180,  # refills: 0   Last office visit: 3/9/2020 with prescribing provider:  Dr. Ramos   Future Office Visit:            "

## 2020-10-09 NOTE — TELEPHONE ENCOUNTER
Refused request for refills x3 as pt will be due for annual exam in January.  Sarah Cedeno RN on 10/9/2020 at 3:25 PM

## 2020-10-16 DIAGNOSIS — E53.8 VITAMIN B12 DEFICIENCY (NON ANEMIC): Primary | ICD-10-CM

## 2020-12-27 ENCOUNTER — HEALTH MAINTENANCE LETTER (OUTPATIENT)
Age: 50
End: 2020-12-27

## 2021-01-22 ENCOUNTER — OFFICE VISIT (OUTPATIENT)
Dept: OBGYN | Facility: CLINIC | Age: 51
End: 2021-01-22
Payer: COMMERCIAL

## 2021-01-22 VITALS
DIASTOLIC BLOOD PRESSURE: 68 MMHG | BODY MASS INDEX: 24.75 KG/M2 | SYSTOLIC BLOOD PRESSURE: 104 MMHG | HEIGHT: 64 IN | WEIGHT: 145 LBS

## 2021-01-22 DIAGNOSIS — Z13.6 SCREENING FOR CARDIOVASCULAR CONDITION: ICD-10-CM

## 2021-01-22 DIAGNOSIS — G43.109 MIGRAINE WITH AURA AND WITHOUT STATUS MIGRAINOSUS, NOT INTRACTABLE: ICD-10-CM

## 2021-01-22 DIAGNOSIS — F33.1 MODERATE EPISODE OF RECURRENT MAJOR DEPRESSIVE DISORDER (H): ICD-10-CM

## 2021-01-22 DIAGNOSIS — Z13.228 SCREENING FOR METABOLIC DISORDER: ICD-10-CM

## 2021-01-22 DIAGNOSIS — Z01.419 ENCOUNTER FOR GYNECOLOGICAL EXAMINATION WITHOUT ABNORMAL FINDING: Primary | ICD-10-CM

## 2021-01-22 DIAGNOSIS — R53.83 OTHER FATIGUE: ICD-10-CM

## 2021-01-22 DIAGNOSIS — E53.8 VITAMIN B12 DEFICIENCY (NON ANEMIC): ICD-10-CM

## 2021-01-22 DIAGNOSIS — Z13.1 SCREENING FOR DIABETES MELLITUS: ICD-10-CM

## 2021-01-22 DIAGNOSIS — N95.1 SYMPTOMS, SUCH AS FLUSHING, SLEEPLESSNESS, HEADACHE, LACK OF CONCENTRATION, ASSOCIATED WITH THE MENOPAUSE: ICD-10-CM

## 2021-01-22 DIAGNOSIS — K20.90 ESOPHAGITIS DETERMINED BY BIOPSY: ICD-10-CM

## 2021-01-22 DIAGNOSIS — Z13.21 ENCOUNTER FOR VITAMIN DEFICIENCY SCREENING: ICD-10-CM

## 2021-01-22 DIAGNOSIS — Z13.29 SCREENING FOR THYROID DISORDER: ICD-10-CM

## 2021-01-22 PROCEDURE — 99215 OFFICE O/P EST HI 40 MIN: CPT | Mod: 25 | Performed by: OBSTETRICS & GYNECOLOGY

## 2021-01-22 PROCEDURE — 99396 PREV VISIT EST AGE 40-64: CPT | Performed by: OBSTETRICS & GYNECOLOGY

## 2021-01-22 RX ORDER — ESTRADIOL 0.05 MG/D
PATCH, EXTENDED RELEASE TRANSDERMAL
Qty: 25 PATCH | Refills: 3 | Status: SHIPPED | OUTPATIENT
Start: 2021-01-22 | End: 2021-11-17

## 2021-01-22 RX ORDER — TOPIRAMATE 50 MG/1
50 TABLET, FILM COATED ORAL 2 TIMES DAILY
Qty: 180 TABLET | Refills: 3 | Status: SHIPPED | OUTPATIENT
Start: 2021-01-22 | End: 2022-04-25

## 2021-01-22 RX ORDER — ESCITALOPRAM OXALATE 20 MG/1
20 TABLET ORAL DAILY
Qty: 90 TABLET | Refills: 3 | Status: SHIPPED | OUTPATIENT
Start: 2021-01-22 | End: 2022-03-15

## 2021-01-22 RX ORDER — OMEPRAZOLE 40 MG/1
CAPSULE, DELAYED RELEASE ORAL
Qty: 90 CAPSULE | Refills: 3 | Status: SHIPPED | OUTPATIENT
Start: 2021-01-22 | End: 2021-08-06

## 2021-01-22 RX ORDER — CYANOCOBALAMIN 1000 UG/ML
1 INJECTION, SOLUTION INTRAMUSCULAR; SUBCUTANEOUS
Qty: 3 ML | Refills: 4 | Status: SHIPPED | OUTPATIENT
Start: 2021-01-22 | End: 2023-07-14

## 2021-01-22 RX ORDER — BUPROPION HYDROCHLORIDE 300 MG/1
TABLET ORAL
Qty: 90 TABLET | Refills: 3 | Status: SHIPPED | OUTPATIENT
Start: 2021-01-22 | End: 2022-02-16

## 2021-01-22 ASSESSMENT — PATIENT HEALTH QUESTIONNAIRE - PHQ9
SUM OF ALL RESPONSES TO PHQ QUESTIONS 1-9: 13
SUM OF ALL RESPONSES TO PHQ QUESTIONS 1-9: 13
10. IF YOU CHECKED OFF ANY PROBLEMS, HOW DIFFICULT HAVE THESE PROBLEMS MADE IT FOR YOU TO DO YOUR WORK, TAKE CARE OF THINGS AT HOME, OR GET ALONG WITH OTHER PEOPLE: VERY DIFFICULT

## 2021-01-22 ASSESSMENT — MIFFLIN-ST. JEOR: SCORE: 1249.78
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
## 2021-01-23 ASSESSMENT — PATIENT HEALTH QUESTIONNAIRE - PHQ9: SUM OF ALL RESPONSES TO PHQ QUESTIONS 1-9: 13

## 2021-02-18 DIAGNOSIS — E53.8 VITAMIN B12 DEFICIENCY (NON ANEMIC): ICD-10-CM

## 2021-02-18 DIAGNOSIS — Z13.29 SCREENING FOR THYROID DISORDER: ICD-10-CM

## 2021-02-18 DIAGNOSIS — Z13.1 SCREENING FOR DIABETES MELLITUS: ICD-10-CM

## 2021-02-18 DIAGNOSIS — R94.4 DECREASED CALCULATED GLOMERULAR FILTRATION RATE (GFR): Primary | ICD-10-CM

## 2021-02-18 DIAGNOSIS — Z13.228 SCREENING FOR METABOLIC DISORDER: ICD-10-CM

## 2021-02-18 DIAGNOSIS — Z13.6 SCREENING FOR CARDIOVASCULAR CONDITION: ICD-10-CM

## 2021-02-18 DIAGNOSIS — Z13.21 ENCOUNTER FOR VITAMIN DEFICIENCY SCREENING: ICD-10-CM

## 2021-02-18 LAB
HBA1C MFR BLD: 4.5 % (ref 0–5.6)
VIT B12 SERPL-MCNC: 658 PG/ML (ref 193–986)

## 2021-02-18 PROCEDURE — 80053 COMPREHEN METABOLIC PANEL: CPT | Performed by: OBSTETRICS & GYNECOLOGY

## 2021-02-18 PROCEDURE — 80061 LIPID PANEL: CPT | Performed by: OBSTETRICS & GYNECOLOGY

## 2021-02-18 PROCEDURE — 82306 VITAMIN D 25 HYDROXY: CPT | Performed by: OBSTETRICS & GYNECOLOGY

## 2021-02-18 PROCEDURE — 83036 HEMOGLOBIN GLYCOSYLATED A1C: CPT | Performed by: OBSTETRICS & GYNECOLOGY

## 2021-02-18 PROCEDURE — 36415 COLL VENOUS BLD VENIPUNCTURE: CPT | Performed by: OBSTETRICS & GYNECOLOGY

## 2021-02-18 PROCEDURE — 82607 VITAMIN B-12: CPT | Performed by: OBSTETRICS & GYNECOLOGY

## 2021-02-18 PROCEDURE — 84443 ASSAY THYROID STIM HORMONE: CPT | Performed by: OBSTETRICS & GYNECOLOGY

## 2021-02-19 ENCOUNTER — MYC MEDICAL ADVICE (OUTPATIENT)
Dept: OBGYN | Facility: CLINIC | Age: 51
End: 2021-02-19

## 2021-02-19 LAB
ALBUMIN SERPL-MCNC: 3.9 G/DL (ref 3.4–5)
ALP SERPL-CCNC: 68 U/L (ref 40–150)
ALT SERPL W P-5'-P-CCNC: 31 U/L (ref 0–50)
ANION GAP SERPL CALCULATED.3IONS-SCNC: 6 MMOL/L (ref 3–14)
AST SERPL W P-5'-P-CCNC: 20 U/L (ref 0–45)
BILIRUB SERPL-MCNC: 0.5 MG/DL (ref 0.2–1.3)
BUN SERPL-MCNC: 18 MG/DL (ref 7–30)
CALCIUM SERPL-MCNC: 9.1 MG/DL (ref 8.5–10.1)
CHLORIDE SERPL-SCNC: 112 MMOL/L (ref 94–109)
CHOLEST SERPL-MCNC: 207 MG/DL
CO2 SERPL-SCNC: 22 MMOL/L (ref 20–32)
CREAT SERPL-MCNC: 1.6 MG/DL (ref 0.52–1.04)
DEPRECATED CALCIDIOL+CALCIFEROL SERPL-MC: 48 UG/L (ref 20–75)
GFR SERPL CREATININE-BSD FRML MDRD: 37 ML/MIN/{1.73_M2}
GLUCOSE SERPL-MCNC: 71 MG/DL (ref 70–99)
HDLC SERPL-MCNC: 78 MG/DL
LDLC SERPL CALC-MCNC: 119 MG/DL
NONHDLC SERPL-MCNC: 129 MG/DL
POTASSIUM SERPL-SCNC: 4.3 MMOL/L (ref 3.4–5.3)
PROT SERPL-MCNC: 7 G/DL (ref 6.8–8.8)
SODIUM SERPL-SCNC: 140 MMOL/L (ref 133–144)
TRIGL SERPL-MCNC: 52 MG/DL
TSH SERPL DL<=0.005 MIU/L-ACNC: 1.41 MU/L (ref 0.4–4)

## 2021-02-19 NOTE — TELEPHONE ENCOUNTER
Routing mychart msg to provider for review/recommendation.    Jennifer Gan RN on 2/19/2021 at 2:56 PM

## 2021-02-25 DIAGNOSIS — R94.4 DECREASED CALCULATED GLOMERULAR FILTRATION RATE (GFR): ICD-10-CM

## 2021-02-25 PROCEDURE — 80048 BASIC METABOLIC PNL TOTAL CA: CPT | Performed by: OBSTETRICS & GYNECOLOGY

## 2021-02-25 PROCEDURE — 82043 UR ALBUMIN QUANTITATIVE: CPT | Performed by: OBSTETRICS & GYNECOLOGY

## 2021-02-25 PROCEDURE — 36415 COLL VENOUS BLD VENIPUNCTURE: CPT | Performed by: OBSTETRICS & GYNECOLOGY

## 2021-02-26 LAB
ANION GAP SERPL CALCULATED.3IONS-SCNC: 7 MMOL/L (ref 3–14)
BUN SERPL-MCNC: 15 MG/DL (ref 7–30)
CALCIUM SERPL-MCNC: 9.4 MG/DL (ref 8.5–10.1)
CHLORIDE SERPL-SCNC: 111 MMOL/L (ref 94–109)
CO2 SERPL-SCNC: 22 MMOL/L (ref 20–32)
CREAT SERPL-MCNC: 1.53 MG/DL (ref 0.52–1.04)
CREAT UR-MCNC: 103 MG/DL
GFR SERPL CREATININE-BSD FRML MDRD: 39 ML/MIN/{1.73_M2}
GLUCOSE SERPL-MCNC: 68 MG/DL (ref 70–99)
MICROALBUMIN UR-MCNC: 21 MG/L
MICROALBUMIN/CREAT UR: 20.78 MG/G CR (ref 0–25)
POTASSIUM SERPL-SCNC: 4 MMOL/L (ref 3.4–5.3)
SODIUM SERPL-SCNC: 140 MMOL/L (ref 133–144)

## 2021-04-05 DIAGNOSIS — R76.8 POSITIVE ANA (ANTINUCLEAR ANTIBODY): ICD-10-CM

## 2021-04-05 DIAGNOSIS — R53.83 OTHER FATIGUE: Primary | ICD-10-CM

## 2021-04-05 DIAGNOSIS — N18.30 STAGE 3 CHRONIC KIDNEY DISEASE, UNSPECIFIED WHETHER STAGE 3A OR 3B CKD (H): ICD-10-CM

## 2021-04-06 ENCOUNTER — TELEPHONE (OUTPATIENT)
Dept: OBGYN | Facility: CLINIC | Age: 51
End: 2021-04-06

## 2021-04-06 NOTE — TELEPHONE ENCOUNTER
Muna Ramos MD  P We Triage             Patient just finished work up with renal for her increased creatinine and I got his notes on that     However given ehr systemic sx and fatigue and malaise and she now has +MARIBELL, i'd like her to see rheumatology to work that up.     [facility]     Can we call patient and tell her I'd like her to make an appointment with them?     AJ      Called and attempted to leave message although vm cut off. Sent a [employer] message to patient informing of above and contact us she rec'd message.    Sarah Cedeno, RN on 4/6/2021 at 3:26 PM

## 2021-04-17 ENCOUNTER — MEDICAL CORRESPONDENCE (OUTPATIENT)
Dept: HEALTH INFORMATION MANAGEMENT | Facility: CLINIC | Age: 51
End: 2021-04-17

## 2021-04-24 ENCOUNTER — HEALTH MAINTENANCE LETTER (OUTPATIENT)
Age: 51
End: 2021-04-24

## 2021-05-03 DIAGNOSIS — N18.31 STAGE 3A CHRONIC KIDNEY DISEASE (H): Primary | ICD-10-CM

## 2021-05-03 DIAGNOSIS — N10: ICD-10-CM

## 2021-05-20 ENCOUNTER — ANCILLARY PROCEDURE (OUTPATIENT)
Dept: MAMMOGRAPHY | Facility: CLINIC | Age: 51
End: 2021-05-20
Payer: COMMERCIAL

## 2021-05-20 DIAGNOSIS — Z12.31 VISIT FOR SCREENING MAMMOGRAM: ICD-10-CM

## 2021-05-20 DIAGNOSIS — N18.31 STAGE 3A CHRONIC KIDNEY DISEASE (H): ICD-10-CM

## 2021-05-20 DIAGNOSIS — N10: ICD-10-CM

## 2021-05-20 LAB
ANION GAP SERPL CALCULATED.3IONS-SCNC: 5 MMOL/L (ref 3–14)
BUN SERPL-MCNC: 17 MG/DL (ref 7–30)
CALCIUM SERPL-MCNC: 8.7 MG/DL (ref 8.5–10.1)
CHLORIDE SERPL-SCNC: 112 MMOL/L (ref 94–109)
CO2 SERPL-SCNC: 23 MMOL/L (ref 20–32)
CREAT SERPL-MCNC: 1.28 MG/DL (ref 0.52–1.04)
GFR SERPL CREATININE-BSD FRML MDRD: 48 ML/MIN/{1.73_M2}
GLUCOSE SERPL-MCNC: 78 MG/DL (ref 70–99)
POTASSIUM SERPL-SCNC: 4.1 MMOL/L (ref 3.4–5.3)
SODIUM SERPL-SCNC: 140 MMOL/L (ref 133–144)

## 2021-05-20 PROCEDURE — 77063 BREAST TOMOSYNTHESIS BI: CPT | Mod: TC | Performed by: RADIOLOGY

## 2021-05-20 PROCEDURE — 80048 BASIC METABOLIC PNL TOTAL CA: CPT | Performed by: INTERNAL MEDICINE

## 2021-05-20 PROCEDURE — 36415 COLL VENOUS BLD VENIPUNCTURE: CPT | Performed by: INTERNAL MEDICINE

## 2021-05-20 PROCEDURE — 77067 SCR MAMMO BI INCL CAD: CPT | Mod: TC | Performed by: RADIOLOGY

## 2021-06-05 ENCOUNTER — MEDICAL CORRESPONDENCE (OUTPATIENT)
Dept: HEALTH INFORMATION MANAGEMENT | Facility: CLINIC | Age: 51
End: 2021-06-05

## 2021-06-24 ENCOUNTER — OFFICE VISIT (OUTPATIENT)
Dept: RHEUMATOLOGY | Facility: CLINIC | Age: 51
End: 2021-06-24
Payer: COMMERCIAL

## 2021-06-24 VITALS
HEIGHT: 64 IN | HEART RATE: 66 BPM | SYSTOLIC BLOOD PRESSURE: 103 MMHG | DIASTOLIC BLOOD PRESSURE: 74 MMHG | WEIGHT: 140 LBS | OXYGEN SATURATION: 100 % | BODY MASS INDEX: 23.9 KG/M2

## 2021-06-24 DIAGNOSIS — R53.83 OTHER FATIGUE: ICD-10-CM

## 2021-06-24 DIAGNOSIS — N18.30 STAGE 3 CHRONIC KIDNEY DISEASE, UNSPECIFIED WHETHER STAGE 3A OR 3B CKD (H): ICD-10-CM

## 2021-06-24 DIAGNOSIS — R76.8 POSITIVE ANA (ANTINUCLEAR ANTIBODY): ICD-10-CM

## 2021-06-24 LAB
ALT SERPL W P-5'-P-CCNC: 34 U/L (ref 0–50)
AST SERPL W P-5'-P-CCNC: 17 U/L (ref 0–45)
CREAT SERPL-MCNC: 1.34 MG/DL (ref 0.52–1.04)
GFR SERPL CREATININE-BSD FRML MDRD: 45 ML/MIN/{1.73_M2}

## 2021-06-24 PROCEDURE — 86431 RHEUMATOID FACTOR QUANT: CPT | Performed by: STUDENT IN AN ORGANIZED HEALTH CARE EDUCATION/TRAINING PROGRAM

## 2021-06-24 PROCEDURE — 82565 ASSAY OF CREATININE: CPT | Performed by: STUDENT IN AN ORGANIZED HEALTH CARE EDUCATION/TRAINING PROGRAM

## 2021-06-24 PROCEDURE — 84165 PROTEIN E-PHORESIS SERUM: CPT | Performed by: STUDENT IN AN ORGANIZED HEALTH CARE EDUCATION/TRAINING PROGRAM

## 2021-06-24 PROCEDURE — 82784 ASSAY IGA/IGD/IGG/IGM EACH: CPT | Performed by: STUDENT IN AN ORGANIZED HEALTH CARE EDUCATION/TRAINING PROGRAM

## 2021-06-24 PROCEDURE — 86235 NUCLEAR ANTIGEN ANTIBODY: CPT | Performed by: STUDENT IN AN ORGANIZED HEALTH CARE EDUCATION/TRAINING PROGRAM

## 2021-06-24 PROCEDURE — 99N1036 PR STATISTIC TOTAL PROTEIN: Performed by: STUDENT IN AN ORGANIZED HEALTH CARE EDUCATION/TRAINING PROGRAM

## 2021-06-24 PROCEDURE — 99244 OFF/OP CNSLTJ NEW/EST MOD 40: CPT | Performed by: STUDENT IN AN ORGANIZED HEALTH CARE EDUCATION/TRAINING PROGRAM

## 2021-06-24 PROCEDURE — 36415 COLL VENOUS BLD VENIPUNCTURE: CPT | Performed by: STUDENT IN AN ORGANIZED HEALTH CARE EDUCATION/TRAINING PROGRAM

## 2021-06-24 PROCEDURE — 86225 DNA ANTIBODY NATIVE: CPT | Performed by: STUDENT IN AN ORGANIZED HEALTH CARE EDUCATION/TRAINING PROGRAM

## 2021-06-24 PROCEDURE — 84450 TRANSFERASE (AST) (SGOT): CPT | Performed by: STUDENT IN AN ORGANIZED HEALTH CARE EDUCATION/TRAINING PROGRAM

## 2021-06-24 PROCEDURE — 86160 COMPLEMENT ANTIGEN: CPT | Performed by: STUDENT IN AN ORGANIZED HEALTH CARE EDUCATION/TRAINING PROGRAM

## 2021-06-24 PROCEDURE — 86334 IMMUNOFIX E-PHORESIS SERUM: CPT | Performed by: STUDENT IN AN ORGANIZED HEALTH CARE EDUCATION/TRAINING PROGRAM

## 2021-06-24 PROCEDURE — 86200 CCP ANTIBODY: CPT | Performed by: STUDENT IN AN ORGANIZED HEALTH CARE EDUCATION/TRAINING PROGRAM

## 2021-06-24 PROCEDURE — 84460 ALANINE AMINO (ALT) (SGPT): CPT | Performed by: STUDENT IN AN ORGANIZED HEALTH CARE EDUCATION/TRAINING PROGRAM

## 2021-06-24 ASSESSMENT — MIFFLIN-ST. JEOR: SCORE: 1230.04

## 2021-06-24 ASSESSMENT — PAIN SCALES - GENERAL: PAINLEVEL: MILD PAIN (2)

## 2021-06-24 NOTE — PROGRESS NOTES
"Ximena Bess's goals for this visit include:   Chief Complaint   Patient presents with     Consult     referred by: Muna Ramos MD  positive MARIBELL     New Patient       She requests these members of her care team be copied on today's visit information: yes    PCP: No Ref-Primary, Physician    Referring Provider:  Muna Ramos MD  [address]  [place] [id number]    /74 (BP Location: Left arm, Patient Position: Sitting, Cuff Size: Adult Regular)   Pulse 66   Ht 1.626 m (5' 4\")   Wt 63.5 kg (140 lb)   SpO2 100%   BMI 24.03 kg/m      Do you need any medication refills at today's visit? No  [facility]    Rheumatology Clinic Visit     Ximena Bess MRN# [id number]   YOB: 1970 Age: 51 year old     Date of Visit: Jun 24, 2021   Primary care provider: No Ref-Primary, Physician          Assessment and Plan:     Assessment     Acute kidney injury - Drug induced acute interstitial Nephritis   Chronic fatigue  Arthralgia - hands, knees  Chronic GERD  + MARIBELL - 1:320 speckled, neg dsDNA, normal C3/C4    Ms. Bess is 51 year old female seen in clinic for evaluation of positive MARIBELL    Acute interstitial nephritis : She developed acute elevation in serum creatinine in 1/2021.  Her serum creatinine increased from baseline of 0.92-1.6 in 1/2021.  She underwent kidney biopsy which showed acute interstitial nephritis with eosinophils indicative of drug-induced AIN.  She was on PPI which was considered to be the culprit drug and was discontinued.  She is off of PPI and also did not start Pepcid.  Her follow-up serum creatinine done in 5/2021 has improved to 1.28 with improvement in GFR to 56.  As part of evaluation of acute interstitial nephritis she had MARIBELL, dsDNA, C3/C4 levels checked.  She had positive MARIBELL of 1: 320 speckled but normal C3/C4 and negative dsDNA.  Kidney biopsy did not show any evidence of lupus nephritis.  Acute interstitial nephritis can be seen in certain " systemic autoimmune connective tissue diseases like systemic lupus, Sjogren's, sarcoidosis.    She also reports chronic fatigue, arthralgias mainly in her knees, legs and hands occasionally.  She uses Tylenol off and on.  She has chronic GERD but denies dysphagia, recurrent oral sores, sicca symptoms, Raynaud's, photosensitivity, pleurisy.  To evaluate further specific serologies including MENG panel, C3-C4, dsDNA, centromere antibody, SCL 70 ordered..    She reports family history of sarcoidosis in her father.  Her recent chest x-ray did not show any evidence of mediastinal lymphadenopathy.  On exam she does not have any lymphadenopathy.    MARIBELL can also be falsely elevated in 10-15% of normal population.     Arthralgia: Reports pain in her knees, legs below knees and hands.  On exam there is no synovitis or tenderness reported over MCP, PIP joints, bilateral wrists, elbows.  No knee effusions.  No tenderness present over bilateral ankles, MTP joints.  I will do rheumatoid serologies but likelihood of inflammatory arthritis is low.    Plan    Blood test ordered    Continue follow-up with nephrology for acute interstitial nephritis    Follow  up in 4 to 6 weeks    -- Orders placed this encounter  Orders Placed This Encounter   Procedures     MENG antibody panel     Complement C3     Complement C4     DNA ANTIBODY, NATV/2 STRAND     Centromere Antibody IgG     Scleroderma Antibody Scl70 MENG IgG     Rheumatoid factor     Cyclic Citrullinated Peptide Antibody IgG     Protein Immunofixation Serum     Protein electrophoresis     Creatinine     AST     ALT       TIME SPENT:   A total of 60 minutes was spent on the patient today, greater than 50% of that time was spent on face to face counseling and care coordination regarding diagnoses and treatment options as mentioned above.                    Active Problem List:     Patient Active Problem List    Diagnosis Date Noted     Vitamin B12 deficiency (non anemic) 01/22/2021      "Priority: Medium     Esophagitis determined by biopsy 01/22/2021     Priority: Medium     Elevated serum creatinine 10/14/2017     Priority: Medium     Migraine with aura and without status migrainosus, not intractable 10/14/2017     Priority: Medium     topamax for prevention       Moderate episode of recurrent major depressive disorder (H) 09/08/2016     Priority: Medium     Symptoms, such as flushing, sleeplessness, headache, lack of concentration, associated with the menopause 09/08/2016     Priority: Medium     S/P total hysterectomy and bilateral salpingo-oophorectomy 09/08/2016     Priority: Medium            History of Present Illness:   Ximena Bess is a 51 year old female with PMH of acute interstitial nephritis, hypertension, chronic fatigue, GERD seen in the clinic in consultation at request of Dr Ramos for evaluation of positive MARIBELL.     She has been struggling from depression, malaise, fatigue for more than 6 months.  In 1/2020 when she was seen by Dr. Ramos for her annual physical, she reported being more anxious, depressed. Her basic labs showed elevated creatinine of 1.6 with GFR of 37.  Repeat creatinine in a week was elevated at 1.53.  She was referred to nephrology for further evaluation.  In March during one of the office visit her blood pressure was very high at 163/102.  She had urine analysis, CRP, rheumatoid factor, MARIBELL, RNP, SSA/SSB which all came back normal except for positive MARIBELL of 1: 320 speckled.  She also had an echocardiogram which showed normal left ventricular size, with ejection fraction 65%.  No valvular disease.  Chest x-ray was normal, no evidence of pulmonary edema, mediastinal lymphadenopathy or infiltrates.    She was seen by Dr. Vergara in Nephrology on 3/9/21.  She underwent Kidney biopsy on 3/31/21 which showed \" 1 small focus of tubulointerstitial inflammation suspicious for acute interstitial nephritis.  There was no significant arteriolar " hyalinosis/hypertensive changes and minimal IFTA.     Her main concern today is overall malaise, leg pain, knee and hand arthralgia.  It has been going on for a year. It has become more intense. Some morning it is hard to get out of bed. She does not take NSAIDS due to kidney disease.     She has chronic GERD for 3 years. She has dry eyes, no recurrent sores. Denies any raynauds, malar rash, photosensitivity, recurrent mouth/genital ulcers, sicca symptoms, pleuritic chest pains, recurrent sinusitis/rhinitis, swallowing difficulty, hearing or visual changes recently.        She has Fhx of sarcoidosis in her father.      No history of psoriasis, ulcerative colitis or chron's disease. No h/o iritis, enthesitis, finger or toe swelling like dactylitis, plantar fascitis or heel pain. No h/o arterial/venous thrombosis in the past.           Review of Systems:     Review Of Systems  Constitutional: denies fever, chills, night sweats and weight loss.  Skin: No skin rash.  Eyes: No dryness or irritation in eyes. No episode of eye inflammation or redness.   Ears/Nose/Throat: no recurrent sinus infections.  Respiratory: No shortness of breath, dyspnea on exertion, cough, or hemoptysis  Cardiovascular: no chest pain or palpitations.  Gastrointestinal: no nausea, vomiting, abdominal pain.  Normal bowel movements.  Genitourinary: no dysuria, frequency  or hematuria.  Musculoskeletal: as in HPI  Neurologic: no numbness, tingling.  Psychiatric: no mood disorders.  Hematologic/Lymphatic/Immunologic: no history of easy bruising, petechia or purpura.  No abnormal bleeding.   Endocrine: no h/o thyroid disease or Diabetes.                  Past Medical History:     Past Medical History:   Diagnosis Date     Acute tubulo-interstitial nephritis 10/14/2017    3/21-saw Dr. Vergara. bx revealed acute patchy [id number]. felt it was PPI mediated so stopped prilosec and started prevacid and going to follow creat/GFR trends,     Anxiety       Benign essential hypertension      Depression      Migraine with aura and without status migrainosus, not intractable 10/14/2017    topamax for prevention     Positive JUDE (antinuclear antibody) 04/05/2021    foudn during renal w/u. general fatigue/malaise/+jude but a few other confirm tests neg. 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      Employer: [employer]   Tobacco Use     Smoking status: Never Smoker     Smokeless tobacco: Never Used   Substance and Sexual Activity     Alcohol use: Yes     Alcohol/week: 0.0 standard drinks     Drug use: No     Sexual activity: Yes     Partners: Male     Birth control/protection: Female Surgical     Comment: MAURICE            Family History:     Family History   Problem Relation Age of Onset     Lactose Intolerance Father 55     No Known Problems Mother      No Known Problems Sister      No Known Problems Brother      No Known Problems Maternal Grandmother      No Known Problems Maternal Grandfather      No Known Problems Paternal Grandmother      No Known Problems Other             Allergies:     Allergies   Allergen Reactions     Seasonal Allergies             Medications:     Current Outpatient Medications   Medication Sig Dispense Refill     buPROPion (WELLBUTRIN XL) 300 MG 24 hr tablet TAKE 1 TABLET BY MOUTH DAILY EVERY MORNING 90 tablet 3     cyanocobalamin (CYANOCOBALAMIN) 1000 MCG/ML injection Inject 1 mL (1,000 mcg) into the muscle every 30 days 3 mL 4     escitalopram (LEXAPRO) 20 MG tablet Take 1 tablet (20 mg) by mouth daily 90 tablet  "3     Fexofenadine HCl (ALLEGRA PO)        Needle, Disp, 25G X 1-1/2\" MISC Use as directed with syringe monthly for B12 injections 10 each 1     syringe, disposable, 1 ML MISC Use with needle as directed for monthly B12 injections 25 each 0     topiramate (TOPAMAX) 50 MG tablet Take 1 tablet (50 mg) by mouth 2 times daily 180 tablet 3     VITAMIN D PO        estradiol (VIVELLE-DOT) 0.05 MG/24HR bi-weekly patch PLACE 1 PATCH TOPICALLY ONTO CLEAN SKIN TWICE WEEKLY 25 patch 3     omeprazole (PRILOSEC) 40 MG DR capsule TK 1 C PO QAM 90 capsule 3            Physical Exam:   Blood pressure 103/74, pulse 66, height 1.626 m (5' 4\"), weight 63.5 kg (140 lb), SpO2 100 %, not currently breastfeeding.  Wt Readings from Last 4 Encounters:   06/24/21 63.5 kg (140 lb)   01/22/21 65.8 kg (145 lb)   03/09/20 66.2 kg (146 lb)   01/20/20 67 kg (147 lb 12.8 oz)       Constitutional: well-developed, appearing stated age; cooperative  Eyes: nl EOM, PERRLA, vision, conjunctiva, sclera  ENT: nl external ears, nose, hearing, lips, teeth, gums, throat  No mucous membrane lesions, normal saliva pool  Neck: no mass or thyroid enlargement  Resp: lungs clear to auscultation, nl to palpation  CV: RRR, no murmurs, rubs or gallops, no edema  GI: no ABD mass or tenderness, no HSM  : not tested  Lymph: no cervical, supraclavicular, inguinal or epitrochlear nodes    MS: All TMJ, neck, shoulder, elbow, wrist, MCP/PIP/DIP, spine, hip, knee, ankle, and foot MTP/IP joints were examined.     -- No synovitis or tenderness reported over MCP, PIP joints, bilateral wrists, elbows.  No knee effusions.  No tenderness present over bilateral ankles, MTP joints.   -- No dactylitis,  tenosynovitis, enthesopathy.    Skin: no nail pitting, alopecia, rash, nodules or lesions  Neuro: nl cranial nerves, strength, sensation, DTRs.   Psych: nl judgement, orientation, memory, affect.         Data:     No results found for any visits on 06/24/21.    Recent Labs   Lab Test " 05/20/21  1209 02/25/21  0955 02/18/21  1100 01/20/20  1140 01/21/19  1236   ALBUMIN  --   --  3.9 4.0 4.1   BUN 17 15 18 15 11      Recent Labs   Lab Test 02/18/21  1100 01/20/20  1140 01/21/19  1236   TSH 1.41 1.76 1.57     Urea Nitrogen   Date Value Ref Range Status   05/20/2021 17 7 - 30 mg/dL Final   02/25/2021 15 7 - 30 mg/dL Final   02/18/2021 18 7 - 30 mg/dL Final     AST   Date Value Ref Range Status   02/18/2021 20 0 - 45 U/L Final   01/20/2020 18 0 - 45 U/L Final   01/21/2019 17 0 - 45 U/L Final     Albumin   Date Value Ref Range Status   02/18/2021 3.9 3.4 - 5.0 g/dL Final   01/20/2020 4.0 3.4 - 5.0 g/dL Final   01/21/2019 4.1 3.4 - 5.0 g/dL Final     Alkaline Phosphatase   Date Value Ref Range Status   02/18/2021 68 40 - 150 U/L Final   01/20/2020 60 40 - 150 U/L Final   01/21/2019 79 40 - 150 U/L Final     ALT   Date Value Ref Range Status   02/18/2021 31 0 - 50 U/L Final   01/20/2020 22 0 - 50 U/L Final   01/21/2019 17 0 - 50 U/L Final     Recent Labs   Lab Test 05/20/21  1209 02/25/21  0955 02/18/21  1100 01/20/20  1140 01/21/19  1236   BUN 17 15 18 15 11   TSH  --   --  1.41 1.76 1.57   AST  --   --  20 18 17   ALT  --   --  31 22 17   ALKPHOS  --   --  68 60 79     Other studies:   Outside studies reviewed:     Reviewed Rheumatology lab flowsheet    Nanci Singer MD  [facility]  Department of Rheumatology & Autoimmune Disorders  [employer] [place]: [phone]   Pager - [phone]

## 2021-06-25 LAB
ALBUMIN SERPL ELPH-MCNC: 4.3 G/DL (ref 3.7–5.1)
ALPHA1 GLOB SERPL ELPH-MCNC: 0.3 G/DL (ref 0.2–0.4)
ALPHA2 GLOB SERPL ELPH-MCNC: 0.6 G/DL (ref 0.5–0.9)
B-GLOBULIN SERPL ELPH-MCNC: 0.6 G/DL (ref 0.6–1)
C3 SERPL-MCNC: 100 MG/DL (ref 81–157)
C4 SERPL-MCNC: 27 MG/DL (ref 13–39)
CCP AB SER IA-ACNC: 1 U/ML
CENTROMERE IGG SER-ACNC: <0.2 AI (ref 0–0.9)
DSDNA AB SER-ACNC: 1 IU/ML
ENA RNP IGG SER IA-ACNC: 0.7 AI (ref 0–0.9)
ENA SCL70 IGG SER IA-ACNC: <0.2 AI (ref 0–0.9)
ENA SM IGG SER-ACNC: <0.2 AI (ref 0–0.9)
ENA SS-A IGG SER IA-ACNC: <0.2 AI (ref 0–0.9)
ENA SS-B IGG SER IA-ACNC: <0.2 AI (ref 0–0.9)
GAMMA GLOB SERPL ELPH-MCNC: 0.8 G/DL (ref 0.7–1.6)
IGA SERPL-MCNC: 101 MG/DL (ref 84–499)
IGG SERPL-MCNC: 811 MG/DL (ref 610–1616)
IGM SERPL-MCNC: 52 MG/DL (ref 35–242)
M PROTEIN SERPL ELPH-MCNC: 0 G/DL
PROT PATTERN SERPL ELPH-IMP: NORMAL
PROT PATTERN SERPL IFE-IMP: NORMAL
RHEUMATOID FACT SER NEPH-ACNC: <7 IU/ML (ref 0–20)

## 2021-07-12 DIAGNOSIS — N10: ICD-10-CM

## 2021-07-12 DIAGNOSIS — N18.31 STAGE 3A CHRONIC KIDNEY DISEASE (H): Primary | ICD-10-CM

## 2021-07-13 ENCOUNTER — ALLIED HEALTH/NURSE VISIT (OUTPATIENT)
Dept: NURSING | Facility: CLINIC | Age: 51
End: 2021-07-13
Payer: COMMERCIAL

## 2021-07-13 VITALS
HEART RATE: 73 BPM | WEIGHT: 143 LBS | BODY MASS INDEX: 24.55 KG/M2 | DIASTOLIC BLOOD PRESSURE: 66 MMHG | SYSTOLIC BLOOD PRESSURE: 101 MMHG

## 2021-07-13 DIAGNOSIS — N10: ICD-10-CM

## 2021-07-13 DIAGNOSIS — N18.31 STAGE 3A CHRONIC KIDNEY DISEASE (H): ICD-10-CM

## 2021-07-13 LAB
ALBUMIN UR-MCNC: NEGATIVE MG/DL
APPEARANCE UR: CLEAR
BACTERIA #/AREA URNS HPF: ABNORMAL /HPF
BILIRUB UR QL STRIP: NEGATIVE
COLOR UR AUTO: YELLOW
GLUCOSE UR STRIP-MCNC: NEGATIVE MG/DL
HGB UR QL STRIP: NEGATIVE
KETONES UR STRIP-MCNC: NEGATIVE MG/DL
LEUKOCYTE ESTERASE UR QL STRIP: NEGATIVE
NITRATE UR QL: NEGATIVE
PH UR STRIP: 5.5 [PH] (ref 5–7)
RBC #/AREA URNS AUTO: ABNORMAL /HPF
SP GR UR STRIP: 1.02 (ref 1–1.03)
SQUAMOUS #/AREA URNS AUTO: ABNORMAL /LPF
UROBILINOGEN UR STRIP-ACNC: 0.2 E.U./DL
WBC #/AREA URNS AUTO: ABNORMAL /HPF

## 2021-07-13 PROCEDURE — 99207 PR NO CHARGE NURSE ONLY: CPT

## 2021-07-13 PROCEDURE — 81001 URINALYSIS AUTO W/SCOPE: CPT

## 2021-07-13 PROCEDURE — 36415 COLL VENOUS BLD VENIPUNCTURE: CPT

## 2021-07-13 PROCEDURE — 80076 HEPATIC FUNCTION PANEL: CPT

## 2021-07-14 LAB
ALBUMIN SERPL-MCNC: 3.8 G/DL (ref 3.4–5)
ALP SERPL-CCNC: 84 U/L (ref 40–150)
ALT SERPL W P-5'-P-CCNC: 42 U/L
AST SERPL W P-5'-P-CCNC: 23 U/L (ref 0–45)
BILIRUB DIRECT SERPL-MCNC: 0.1 MG/DL (ref 0–0.2)
BILIRUB SERPL-MCNC: 0.3 MG/DL (ref 0.2–1.3)
PROT SERPL-MCNC: 6.7 G/DL (ref 6.8–8.8)

## 2021-07-15 ENCOUNTER — MEDICAL CORRESPONDENCE (OUTPATIENT)
Dept: HEALTH INFORMATION MANAGEMENT | Facility: CLINIC | Age: 51
End: 2021-07-15

## 2021-07-30 DIAGNOSIS — N18.31 STAGE 3A CHRONIC KIDNEY DISEASE (H): Primary | ICD-10-CM

## 2021-08-02 NOTE — PROGRESS NOTES
Ximena is a 51 year old who is being evaluated via a billable video visit.      How would you like to obtain your AVS? MyChart  If the video visit is dropped, the invitation should be resent by: Text to cell phone [phone]  Will anyone else be joining your video visit? No      Video Start Time: 11:25 AM     Ximena Bess MRN# [id number]   YOB: 1970 Age: 51 year old     Date of Visit: Aug 6, 2021   Primary care provider: No Ref-Primary, Physician          Assessment and Plan:     Assessment     Acute kidney injury - Drug induced acute interstitial Nephritis   Chronic fatigue  Arthralgia - hands, knees  Chronic GERD  + MARIBELL - 1:320 speckled, neg dsDNA, normal C3/C4    Ms. Bess is 51 year old female seen in clinic for evaluation of positive MARIBELL    Acute interstitial nephritis : She developed acute elevation in serum creatinine in 1/2021.  Her serum creatinine increased from baseline of 0.92-1.6 in 1/2021.  She underwent kidney biopsy which showed acute interstitial nephritis with eosinophils indicative of drug-induced AIN.  She was on PPI which was considered to be the culprit drug and was discontinued.  She is off of PPI and also did not start Pepcid.  Her follow-up serum creatinine done in 5/2021 has improved to 1.28 with improvement in GFR to 56.  As part of evaluation of acute interstitial nephritis she had MARIBELL, dsDNA, C3/C4 levels checked.  She had positive MARIBELL of 1: 320 speckled but normal C3/C4 and negative dsDNA.  Kidney biopsy did not show any evidence of lupus nephritis.  Acute interstitial nephritis can be seen in certain systemic autoimmune connective tissue diseases like systemic lupus, Sjogren's, sarcoidosis.    She also reports chronic fatigue, arthralgias mainly in her knees, legs and hands occasionally.  She uses Tylenol off and on.  She has chronic GERD but denies dysphagia, recurrent oral sores, sicca symptoms, Raynaud's, photosensitivity, pleurisy. Specific serologies  including MENG panel, C3-C4, dsDNA, centromere antibody, SCL 70 came back normal.  I will order CK, aldolase levels to evaluate for myositis which can be associated with speckled MARIBELL.    She reports family history of sarcoidosis in her father.  Her recent chest x-ray did not show any evidence of mediastinal lymphadenopathy.  On exam she does not have any lymphadenopathy.    MARIBELL can also be falsely elevated in 10-15% of normal population.     Arthralgia: Reports pain in her knees, lower legs and hands.  On exam there is no synovitis or tenderness reported over MCP, PIP joints, bilateral wrists, elbows.  No knee effusions.  No tenderness present over bilateral ankles, MTP joints.  Her rheumatoid serologies rheumatoid factor, anti-CCP are negative.    Plan    Blood test ordered - CK, Aldolase    Continue follow-up with nephrology for acute interstitial nephritis    Follow up as needed     -- Orders placed this encounter  Orders Placed This Encounter   Procedures     CK total     Aldolase                   Active Problem List:     Patient Active Problem List    Diagnosis Date Noted     Vitamin B12 deficiency (non anemic) 01/22/2021     Priority: Medium     Esophagitis determined by biopsy 01/22/2021     Priority: Medium     Elevated serum creatinine 10/14/2017     Priority: Medium     Migraine with aura and without status migrainosus, not intractable 10/14/2017     Priority: Medium     topamax for prevention       Moderate episode of recurrent major depressive disorder (H) 09/08/2016     Priority: Medium     Symptoms, such as flushing, sleeplessness, headache, lack of concentration, associated with the menopause 09/08/2016     Priority: Medium     S/P total hysterectomy and bilateral salpingo-oophorectomy 09/08/2016     Priority: Medium            History of Present Illness:   Ximena Bess is a 51 year old female with PMH of acute interstitial nephritis, hypertension, chronic fatigue, GERD seen in the clinic in  "consultation at request of Dr Ramos for evaluation of positive MARIBELL.     She has been struggling from depression, malaise, fatigue for more than 6 months.  In 1/2020 when she was seen by Dr. Ramos for her annual physical, she reported being more anxious, depressed. Her basic labs showed elevated creatinine of 1.6 with GFR of 37.  Repeat creatinine in a week was elevated at 1.53.  She was referred to nephrology for further evaluation.  In March during one of the office visit her blood pressure was very high at 163/102.  She had urine analysis, CRP, rheumatoid factor, MARIBELL, RNP, SSA/SSB which all came back normal except for positive MARIBELL of 1: 320 speckled.  She also had an echocardiogram which showed normal left ventricular size, with ejection fraction 65%.  No valvular disease.  Chest x-ray was normal, no evidence of pulmonary edema, mediastinal lymphadenopathy or infiltrates.    She was seen by Dr. Vergara in Nephrology on 3/9/21.  She underwent Kidney biopsy on 3/31/21 which showed \" 1 small focus of tubulointerstitial inflammation suspicious for acute interstitial nephritis.  There was no significant arteriolar hyalinosis/hypertensive changes and minimal IFTA.     Her main concern today is overall malaise, leg pain, knee and hand arthralgia.  It has been going on for a year. It has become more intense. Some morning it is hard to get out of bed. She does not take NSAIDS due to kidney disease.     She has chronic GERD for 3 years. She has dry eyes, no recurrent sores. Denies any raynauds, malar rash, photosensitivity, recurrent mouth/genital ulcers, sicca symptoms, pleuritic chest pains, recurrent sinusitis/rhinitis, swallowing difficulty, hearing or visual changes recently.      She has Fhx of sarcoidosis in her father.      No history of psoriasis, ulcerative colitis or chron's disease. No h/o iritis, enthesitis, finger or toe swelling like dactylitis, plantar fascitis or heel pain. No h/o arterial/venous " thrombosis in the past.      August 6, 2021 -Her autoimmune work-up showed -MENG panel, -dsDNA, -RF, -Anti CCP, -C3/C4, -SCL 70, -centromere antibody, normal SPEP. She does not meet criteria for systemic lupus or other autoimmune connective tissue disease.  She will be seeing her nephrologist in a month.  Overall she is better than before but still reports fatigue, arthralgias.  Pain varies from 4/10 intensity to 7/10 intensity.  She uses Tylenol as needed.  Also reports GERD.         Review of Systems:     Review Of Systems  Constitutional: denies fever, chills, night sweats and weight loss.  Skin: No skin rash.  Eyes: No dryness or irritation in eyes. No episode of eye inflammation or redness.   Ears/Nose/Throat: no recurrent sinus infections.  Respiratory: No shortness of breath, dyspnea on exertion, cough, or hemoptysis  Cardiovascular: no chest pain or palpitations.  Gastrointestinal: no nausea, vomiting, abdominal pain.  Normal bowel movements.  Genitourinary: no dysuria, frequency  or hematuria.  Musculoskeletal: as in HPI  Neurologic: no numbness, tingling.  Psychiatric: no mood disorders.  Hematologic/Lymphatic/Immunologic: no history of easy bruising, petechia or purpura.  No abnormal bleeding.   Endocrine: no h/o thyroid disease or Diabetes.                  Past Medical History:     Past Medical History:   Diagnosis Date     Acute tubulo-interstitial nephritis 10/14/2017    3/21-saw Dr. Vergara. bx revealed acute patchy [id number]. felt it was PPI mediated so stopped prilosec and started prevacid and going to follow creat/GFR trends,     Anxiety      Benign essential hypertension      Depression      Migraine with aura and without status migrainosus, not intractable 10/14/2017    topamax for prevention     Positive JUDE (antinuclear antibody) 04/05/2021    foudn during renal w/u. general fatigue/malaise/+jude but a few other confirm tests neg. 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      Employer: [employer]   Tobacco Use     Smoking status: Never Smoker     Smokeless tobacco: Never Used   Substance and Sexual Activity     Alcohol use: Yes     Alcohol/week: 0.0 standard drinks     Drug use: No     Sexual activity: Yes     Partners: Male     Birth control/protection: Female Surgical     Comment: MAURICE            Family History:     Family History   Problem Relation Age of Onset     Lactose Intolerance Father 55     No Known Problems Mother      No Known Problems Sister      No Known Problems Brother      No Known Problems Maternal Grandmother      No Known Problems Maternal Grandfather      No Known Problems Paternal Grandmother      No Known Problems Other             Allergies:     Allergies   Allergen Reactions     Seasonal Allergies             Medications:     Current Outpatient Medications   Medication Sig Dispense Refill     buPROPion (WELLBUTRIN XL) 300 MG 24 hr tablet TAKE 1 TABLET BY MOUTH DAILY EVERY MORNING 90 tablet 3     carvedilol (COREG) 6.25 MG tablet TAKE 1 TABLET(6.25 MG) BY MOUTH TWICE DAILY       cyanocobalamin (CYANOCOBALAMIN) 1000 MCG/ML injection Inject 1 mL (1,000 mcg) into the muscle every 30 days 3 mL 4     escitalopram (LEXAPRO) 20 MG tablet Take 1 tablet (20 mg) by mouth daily 90 tablet 3     Fexofenadine HCl (ALLEGRA PO)        Needle, Disp, 25G X 1-1/2\" MISC Use as directed with syringe monthly for B12 injections 10 each 1     syringe, disposable, 1 ML MISC Use with needle as directed for monthly B12 injections 25 each 0     topiramate (TOPAMAX) 50 MG " tablet Take 1 tablet (50 mg) by mouth 2 times daily 180 tablet 3     VITAMIN D PO        estradiol (VIVELLE-DOT) 0.05 MG/24HR bi-weekly patch PLACE 1 PATCH TOPICALLY ONTO CLEAN SKIN TWICE WEEKLY 25 patch 3            Physical Exam:   not currently breastfeeding.  Wt Readings from Last 4 Encounters:   07/13/21 64.9 kg (143 lb)   06/24/21 63.5 kg (140 lb)   01/22/21 65.8 kg (145 lb)   03/09/20 66.2 kg (146 lb)       Constitutional: well-developed, appearing stated age; cooperative  Eyes: nl EOM, PERRLA, vision, conjunctiva, sclera  ENT: nl external ears, nose, hearing, lips, teeth, gums, throat  No mucous membrane lesions, normal saliva pool  Neck: no mass or thyroid enlargement  Resp: lungs clear to auscultation, nl to palpation  CV: RRR, no murmurs, rubs or gallops, no edema  GI: no ABD mass or tenderness, no HSM  : not tested  Lymph: no cervical, supraclavicular, inguinal or epitrochlear nodes    MS: All TMJ, neck, shoulder, elbow, wrist, MCP/PIP/DIP, spine, hip, knee, ankle, and foot MTP/IP joints were examined.     -- No synovitis or tenderness reported over MCP, PIP joints, bilateral wrists, elbows.  No knee effusions.  No tenderness present over bilateral ankles, MTP joints.   -- No dactylitis,  tenosynovitis, enthesopathy.    Skin: no nail pitting, alopecia, rash, nodules or lesions  Neuro: nl cranial nerves, strength, sensation, DTRs.   Psych: nl judgement, orientation, memory, affect.         Data:     No results found for any visits on 08/06/21.    Recent Labs   Lab Test 05/20/21  1209 02/25/21  0955 02/18/21  1100 01/20/20  1140 01/21/19  1236   ALBUMIN  --   --  3.9 4.0 4.1   BUN 17 15 18 15 11      Recent Labs   Lab Test 02/18/21  1100 01/20/20  1140 01/21/19  1236   TSH 1.41 1.76 1.57     Urea Nitrogen   Date Value Ref Range Status   05/20/2021 17 7 - 30 mg/dL Final   02/25/2021 15 7 - 30 mg/dL Final   02/18/2021 18 7 - 30 mg/dL Final     AST   Date Value Ref Range Status   07/13/2021 23 0 - 45 U/L  Final   06/24/2021 17 0 - 45 U/L Final   02/18/2021 20 0 - 45 U/L Final   01/20/2020 18 0 - 45 U/L Final     Albumin   Date Value Ref Range Status   07/13/2021 3.8 3.4 - 5.0 g/dL Final   02/18/2021 3.9 3.4 - 5.0 g/dL Final   01/20/2020 4.0 3.4 - 5.0 g/dL Final   01/21/2019 4.1 3.4 - 5.0 g/dL Final     Alkaline Phosphatase   Date Value Ref Range Status   07/13/2021 84 40 - 150 U/L Final   02/18/2021 68 40 - 150 U/L Final   01/20/2020 60 40 - 150 U/L Final   01/21/2019 79 40 - 150 U/L Final     ALT   Date Value Ref Range Status   07/13/2021 42 U/L Final   06/24/2021 34 0 - 50 U/L Final   02/18/2021 31 0 - 50 U/L Final   01/20/2020 22 0 - 50 U/L Final     Rheumatoid Factor   Date Value Ref Range Status   06/24/2021 <7 <12 IU/mL Final     Recent Labs   Lab Test 07/13/21  1411 06/24/21  1559 05/20/21  1209 02/25/21  0955 02/18/21  1100 01/20/20  1140 01/21/19  1236   BUN  --   --  17 15 18 15 11   TSH  --   --   --   --  1.41 1.76 1.57   AST 23 17  --   --  20 18 17   ALT 42 34  --   --  31 22 17   ALKPHOS 84  --   --   --  68 60 79     Other studies:   Outside studies reviewed:     Reviewed Rheumatology lab flowsheet    Nanci Singer MD  [facility]  Department of Rheumatology & Autoimmune Disorders  [facility] [place]: [phone]   Pager - [phone]        Video-Visit Details    Type of service:  Video Visit    Video End Time:11:45 AM     Originating Location (pt. Location): Home    Distant Location (provider location):  [facility] [place]     Platform used for Video Visit: Doximity

## 2021-08-06 ENCOUNTER — VIRTUAL VISIT (OUTPATIENT)
Dept: RHEUMATOLOGY | Facility: CLINIC | Age: 51
End: 2021-08-06
Payer: COMMERCIAL

## 2021-08-06 DIAGNOSIS — R76.8 POSITIVE ANA (ANTINUCLEAR ANTIBODY): Primary | ICD-10-CM

## 2021-08-06 PROCEDURE — 99213 OFFICE O/P EST LOW 20 MIN: CPT | Mod: 95 | Performed by: STUDENT IN AN ORGANIZED HEALTH CARE EDUCATION/TRAINING PROGRAM

## 2021-08-06 RX ORDER — CARVEDILOL 6.25 MG/1
TABLET ORAL
COMMUNITY
Start: 2021-08-05 | End: 2023-07-14

## 2021-08-06 NOTE — RESULT ENCOUNTER NOTE
Results discussed with the patient at the time of visit.     Nanci Singer MD   8/6/2021  11:32 AM

## 2021-10-04 ENCOUNTER — HEALTH MAINTENANCE LETTER (OUTPATIENT)
Age: 51
End: 2021-10-04

## 2021-11-17 ENCOUNTER — E-VISIT (OUTPATIENT)
Dept: URGENT CARE | Facility: URGENT CARE | Age: 51
End: 2021-11-17
Payer: COMMERCIAL

## 2021-11-17 DIAGNOSIS — J01.90 ACUTE BACTERIAL SINUSITIS: Primary | ICD-10-CM

## 2021-11-17 DIAGNOSIS — B96.89 ACUTE BACTERIAL SINUSITIS: Primary | ICD-10-CM

## 2021-11-17 PROCEDURE — 99421 OL DIG E/M SVC 5-10 MIN: CPT | Performed by: FAMILY MEDICINE

## 2021-11-17 NOTE — PATIENT INSTRUCTIONS
Sinusitis (Antibiotic Treatment)    The sinuses are air-filled spaces within the bones of the face. They connect to the inside of the nose. Sinusitis is an inflammation of the tissue that lines the sinuses. Sinusitis can occur during a cold. It can also happen due to allergies to pollens and other particles in the air. Sinusitis can cause symptoms of sinus congestion and a feeling of fullness. A sinus infection causes fever, headache, and facial pain. There is often green or yellow fluid draining from the nose or into the back of the throat (post-nasal drip). You have been given antibiotics to treat this condition.   Home care    Take the full course of antibiotics as instructed. Don't stop taking them, even when you feel better.    Drink plenty of water, hot tea, and other liquids as directed by the healthcare provider. This may help thin nasal mucus. It also may help your sinuses drain fluids.    Heat may help soothe painful areas of your face. Use a towel soaked in hot water. Or,  the shower and direct the warm spray onto your face. Using a vaporizer along with a menthol rub at night may also help soothe symptoms.     An expectorant with guaifenesin may help thin nasal mucus and help your sinuses drain fluids. Talk with your provider or pharmacists before taking an over-the-counter (OTC) medicine if you have any questions about it or its side effects..    You can use an OTC decongestant, unless a similar medicine was prescribed to you. Nasal sprays work the fastest. Use one that contains phenylephrine or oxymetazoline. First blow your nose gently. Then use the spray. Don't use these medicines more often than directed on the label. If you do, your symptoms may get worse. You may also take pills that contain pseudoephedrine. Don t use products that combine multiple medicines. This is because side effects may be increased. Read labels. You can also ask the pharmacist for help. (People with high blood  pressure should not use decongestants. They can raise blood pressure.) Talk with your provider or pharmacist if you have any questions about the medicine..    OTC antihistamines may help if allergies contributed to your sinusitis. Talk with your provider or pharmacist if you have any questions about the medicine..    Don't use nasal rinses or irrigation during an acute sinus infection, unless your healthcare provider tells you to. Rinsing may spread the infection to other areas in your sinuses.    Use acetaminophen or ibuprofen to control pain, unless another pain medicine was prescribed to you. If you have chronic liver or kidney disease or ever had a stomach ulcer, talk with your healthcare provider before using these medicines. Never give aspirin to anyone under age 18 who is ill with a fever. It may cause severe liver damage.    Don't smoke. This can make symptoms worse.    Follow-up care  Follow up with your healthcare provider, or as advised.   When to seek medical advice  Call your healthcare provider if any of these occur:     Facial pain or headache that gets worse    Stiff neck    Unusual drowsiness or confusion    Swelling of your forehead or eyelids    Symptoms don't go away in 10 days    Vision problems, such as blurred or double vision    Fever of 100.4 F (38 C) or higher, or as directed by your healthcare provider  Call 911  Call 911 if any of these occur:     Seizure    Trouble breathing    Feeling dizzy or faint    Fingernails, skin or lips look blue, purple , or gray  Prevention  Here are steps you can take to help prevent an infection:     Keep good hand washing habits.    Don t have close contact with people who have sore throats, colds, or other upper respiratory infections.    Don t smoke, and stay away from secondhand smoke.    Stay up to date with of your vaccines.  [employer] last reviewed this educational content on 12/1/2019 2000-2021 [employer]. All rights reserved. This  information is not intended as a substitute for professional medical care. Always follow your healthcare professional's instructions.        Dear Ximena Bess    After reviewing your responses, I've been able to diagnose you with?a sinus infection caused by bacteria.?     Based on your responses and diagnosis, I have prescribed augmentin to treat your symptoms. I have sent this to your pharmacy.?     It is also important to stay well hydrated, get lots of rest and take over-the-counter decongestants,?tylenol?or ibuprofen if you?are able to?take those medications per your primary care provider to help relieve discomfort.?     It is important that you take?all of?your prescribed medication even if your symptoms are improving after a few doses.? Taking?all of?your medicine helps prevent the symptoms from returning.?     If your symptoms worsen, you develop severe headache, vomiting, high fever (>102), or are not improving in 7 days, please contact your primary care provider for an appointment or visit any of our convenient Walk-in Care or Urgent Care Centers to be seen which can be found on our website?here.?     Thanks again for choosing?us?as your health care partner,?   ?  Kwabena Vaz MD?

## 2021-11-18 ENCOUNTER — MYC MEDICAL ADVICE (OUTPATIENT)
Dept: OBGYN | Facility: CLINIC | Age: 51
End: 2021-11-18
Payer: COMMERCIAL

## 2021-11-22 NOTE — TELEPHONE ENCOUNTER
Sometimes individual docs don't get listed by [facility]. But she works for [employer]. So we're [facility] and shouldn't be an issue

## 2021-12-03 ENCOUNTER — ANCILLARY PROCEDURE (OUTPATIENT)
Dept: GENERAL RADIOLOGY | Facility: CLINIC | Age: 51
End: 2021-12-03
Attending: INTERNAL MEDICINE
Payer: COMMERCIAL

## 2021-12-03 ENCOUNTER — OFFICE VISIT (OUTPATIENT)
Dept: FAMILY MEDICINE | Facility: CLINIC | Age: 51
End: 2021-12-03
Payer: COMMERCIAL

## 2021-12-03 VITALS
TEMPERATURE: 98.2 F | OXYGEN SATURATION: 97 % | SYSTOLIC BLOOD PRESSURE: 105 MMHG | DIASTOLIC BLOOD PRESSURE: 74 MMHG | HEART RATE: 73 BPM | WEIGHT: 150 LBS | BODY MASS INDEX: 25.75 KG/M2

## 2021-12-03 DIAGNOSIS — E53.8 VITAMIN B12 DEFICIENCY (NON ANEMIC): ICD-10-CM

## 2021-12-03 DIAGNOSIS — R76.8 POSITIVE ANA (ANTINUCLEAR ANTIBODY): ICD-10-CM

## 2021-12-03 DIAGNOSIS — G43.109 MIGRAINE WITH AURA AND WITHOUT STATUS MIGRAINOSUS, NOT INTRACTABLE: ICD-10-CM

## 2021-12-03 DIAGNOSIS — M79.604 BILATERAL LEG PAIN: Primary | ICD-10-CM

## 2021-12-03 DIAGNOSIS — D72.819 LEUKOPENIA, UNSPECIFIED TYPE: ICD-10-CM

## 2021-12-03 DIAGNOSIS — I10 ESSENTIAL (PRIMARY) HYPERTENSION: ICD-10-CM

## 2021-12-03 DIAGNOSIS — N18.31 STAGE 3A CHRONIC KIDNEY DISEASE (H): ICD-10-CM

## 2021-12-03 DIAGNOSIS — F33.0 MILD EPISODE OF RECURRENT MAJOR DEPRESSIVE DISORDER (H): ICD-10-CM

## 2021-12-03 DIAGNOSIS — M54.2 NECK PAIN: ICD-10-CM

## 2021-12-03 DIAGNOSIS — M79.605 BILATERAL LEG PAIN: Primary | ICD-10-CM

## 2021-12-03 DIAGNOSIS — Z11.4 SCREENING FOR HIV (HUMAN IMMUNODEFICIENCY VIRUS): ICD-10-CM

## 2021-12-03 DIAGNOSIS — Z11.59 NEED FOR HEPATITIS C SCREENING TEST: ICD-10-CM

## 2021-12-03 DIAGNOSIS — Z23 NEED FOR PROPHYLACTIC VACCINATION AND INOCULATION AGAINST INFLUENZA: ICD-10-CM

## 2021-12-03 LAB
BASOPHILS # BLD AUTO: 0 10E3/UL (ref 0–0.2)
BASOPHILS NFR BLD AUTO: 1 %
CRP SERPL-MCNC: <2.9 MG/L (ref 0–8)
DEPRECATED CALCIDIOL+CALCIFEROL SERPL-MC: 26 UG/L (ref 20–75)
EOSINOPHIL # BLD AUTO: 0 10E3/UL (ref 0–0.7)
EOSINOPHIL NFR BLD AUTO: 1 %
ERYTHROCYTE [DISTWIDTH] IN BLOOD BY AUTOMATED COUNT: 12.5 % (ref 10–15)
ERYTHROCYTE [SEDIMENTATION RATE] IN BLOOD BY WESTERGREN METHOD: 8 MM/HR (ref 0–30)
FOLATE SERPL-MCNC: 13.3 NG/ML
HBA1C MFR BLD: 4.7 % (ref 0–5.6)
HCT VFR BLD AUTO: 37 % (ref 35–47)
HCV AB SERPL QL IA: NONREACTIVE
HGB BLD-MCNC: 12.6 G/DL (ref 11.7–15.7)
HIV 1+2 AB+HIV1 P24 AG SERPL QL IA: NONREACTIVE
LYMPHOCYTES # BLD AUTO: 1.2 10E3/UL (ref 0.8–5.3)
LYMPHOCYTES NFR BLD AUTO: 33 %
MCH RBC QN AUTO: 32.2 PG (ref 26.5–33)
MCHC RBC AUTO-ENTMCNC: 34.1 G/DL (ref 31.5–36.5)
MCV RBC AUTO: 95 FL (ref 78–100)
MONOCYTES # BLD AUTO: 0.3 10E3/UL (ref 0–1.3)
MONOCYTES NFR BLD AUTO: 10 %
NEUTROPHILS # BLD AUTO: 1.9 10E3/UL (ref 1.6–8.3)
NEUTROPHILS NFR BLD AUTO: 55 %
PLATELET # BLD AUTO: 210 10E3/UL (ref 150–450)
RBC # BLD AUTO: 3.91 10E6/UL (ref 3.8–5.2)
VIT B12 SERPL-MCNC: 370 PG/ML (ref 193–986)
WBC # BLD AUTO: 3.5 10E3/UL (ref 4–11)

## 2021-12-03 PROCEDURE — 85652 RBC SED RATE AUTOMATED: CPT | Performed by: INTERNAL MEDICINE

## 2021-12-03 PROCEDURE — 82607 VITAMIN B-12: CPT | Performed by: INTERNAL MEDICINE

## 2021-12-03 PROCEDURE — 82306 VITAMIN D 25 HYDROXY: CPT | Performed by: INTERNAL MEDICINE

## 2021-12-03 PROCEDURE — 86140 C-REACTIVE PROTEIN: CPT | Performed by: INTERNAL MEDICINE

## 2021-12-03 PROCEDURE — 86803 HEPATITIS C AB TEST: CPT | Performed by: INTERNAL MEDICINE

## 2021-12-03 PROCEDURE — 99204 OFFICE O/P NEW MOD 45 MIN: CPT | Mod: 25 | Performed by: INTERNAL MEDICINE

## 2021-12-03 PROCEDURE — 83036 HEMOGLOBIN GLYCOSYLATED A1C: CPT | Performed by: INTERNAL MEDICINE

## 2021-12-03 PROCEDURE — 83550 IRON BINDING TEST: CPT | Performed by: INTERNAL MEDICINE

## 2021-12-03 PROCEDURE — 90471 IMMUNIZATION ADMIN: CPT | Performed by: INTERNAL MEDICINE

## 2021-12-03 PROCEDURE — 82728 ASSAY OF FERRITIN: CPT | Performed by: INTERNAL MEDICINE

## 2021-12-03 PROCEDURE — 36415 COLL VENOUS BLD VENIPUNCTURE: CPT | Performed by: INTERNAL MEDICINE

## 2021-12-03 PROCEDURE — 82746 ASSAY OF FOLIC ACID SERUM: CPT | Performed by: INTERNAL MEDICINE

## 2021-12-03 PROCEDURE — 90682 RIV4 VACC RECOMBINANT DNA IM: CPT | Performed by: INTERNAL MEDICINE

## 2021-12-03 PROCEDURE — 87389 HIV-1 AG W/HIV-1&-2 AB AG IA: CPT | Performed by: INTERNAL MEDICINE

## 2021-12-03 PROCEDURE — 72040 X-RAY EXAM NECK SPINE 2-3 VW: CPT | Performed by: RADIOLOGY

## 2021-12-03 PROCEDURE — 80050 GENERAL HEALTH PANEL: CPT | Performed by: INTERNAL MEDICINE

## 2021-12-03 ASSESSMENT — PAIN SCALES - GENERAL: PAINLEVEL: MODERATE PAIN (4)

## 2021-12-03 NOTE — PROGRESS NOTES
Assessment & Plan     Bilateral leg pain  Chronic, worsening last few weeks. Reassuring exam with improvement w/ massage.  Recommend check labs. Hx of b12 deficiency (currently off injections) possibly contributing.   Check inflammatory  Markers. Denies significant low back pain.  Further instructions based on results and follow-up in two weeks for recheck/next steps.  - CBC with Platelets & Differential  - Comprehensive metabolic panel  - Hemoglobin A1c  - TSH with free T4 reflex  - Vitamin D Deficiency  - Vitamin B12  - Folate  - Iron & Iron Binding Capacity  - Ferritin  - Erythrocyte sedimentation rate auto  - CRP inflammation    Neck pain  Chronic  Check xray  - XR Cervical Spine 2/3 Views    Positive MARIBELL (antinuclear antibody)  Evaluated by rheum    Migraine with aura and without status migrainosus, not intractable  On topamax per gyn for headache prevention, controlled    Mild episode of recurrent major depressive disorder (H)  On lexapro/wellbutrin per gyn for depression/anxiety, controlled    Vitamin B12 deficiency (non anemic)  Recheck, currently off supplementation x 4 months  - Vitamin B12  - Folate    Stage 3a chronic kidney disease (H)  Following nephro    Essential (primary) hypertension  Controlled on BB    Need for prophylactic vaccination and inoculation against influenza  Patient would like flu vaccine today    Need for hepatitis C screening test  - Hepatitis C antibody    Screening for HIV (human immunodeficiency virus)  - HIV Antigen Antibody Combo    > 45 min spent on the date of this encounter doing chart review, documentation, history/exam, and further activities as noted above      Return in about 2 weeks (around 12/17/2021) for Follow up, with me, in person, sooner if symptoms worsen or do not improve.    Trish Steele DO  [facility] MEREDITH Bueno is a 51 year old who presents for the following health issues     History of Present Illness       She eats 2-3  servings of fruits and vegetables daily.She consumes 1 sweetened beverage(s) daily.She exercises with enough effort to increase her heart rate 30 to 60 minutes per day.  She exercises with enough effort to increase her heart rate 3 or less days per week.   She is taking medications regularly.       Chief Complaint   Patient presents with     Neck Pain     x1 year at base of neck that has intensified over the last couple of months; no loss of motion; hurts all of the time     Musculoskeletal Problem     x2 weeks; calf pain both legs (more at night, but during the day as well), burning throbbing; feels better after she is up and moving; ball of feet hurt and get really tight     New Patient     Imm/Inj     Flu Shot     Former PCP: none  Specialists: lawanda (ob/gyn); kidney specialist; rheum  Problem list and medications reviewed and updated. See below for additional notes.  Social: nonsmoker; 1 glass of wine most days    Anxiety/depression-wellbutrin 300mg daily and lexapro 20mg daily. Was prescribed cymbalta earlier this year to trial instead of lexapro due to chronic pain but did not trial, states wasn't a good time.    HTN: on coreg, effective, no SE    CKD3-started seeing nephro recently    Positive MARIBELL-evaluated by rheum    Hx of b12 def-tried at home and also tried coming in but was difficult to plan out  Last injection Was four months ago  1 year ago 148 b12 level  Denies hx of bariatric surgery    Topamax-takes for prevention for headaches, prescribed by gyn    Bilateral calf pain (acute on chronic) and neck pain (chronic)   Leg pains seems to be getting worse over the last few weeks (burning/throbbing)  Was seen for leg pains in the past, states that was mainly knees, now feels more so like both calves, somewhat better with movement but usually present all the time  Neck is chronic, also seems to be worsening.  Denies low back pain.   No weakness in legs or arms.   No headaches on topamax  No joint swelling.    No neck trauma  Neck and leg pains a 4/10 today  No leg swelling  No f/c      Review of Systems   Constitutional, HEENT, cardiovascular, pulmonary, GI, , musculoskeletal, neuro, skin, endocrine and psych systems are negative, except as otherwise noted.      Objective    /74 (BP Location: Left arm, Cuff Size: Adult Regular)   Pulse 73   Temp 98.2  F (36.8  C) (Temporal)   Wt 68 kg (150 lb)   SpO2 97%   BMI 25.75 kg/m    Body mass index is 25.75 kg/m .  Physical Exam     GENERAL APPEARANCE: AAOx3, no distress. Well developed.     EXT: No c/c/e in lower extremities b/l. Negative homans sign. Improvement in symptoms with massage bilaterally    NEURO: Speech is fluent and comprehension intact. No gait abnormalities noted.    PSYCH: appropriate mood and affect.

## 2021-12-04 LAB
ALBUMIN SERPL-MCNC: 3.8 G/DL (ref 3.4–5)
ALP SERPL-CCNC: 91 U/L (ref 40–150)
ALT SERPL W P-5'-P-CCNC: 33 U/L (ref 0–50)
ANION GAP SERPL CALCULATED.3IONS-SCNC: 5 MMOL/L (ref 3–14)
AST SERPL W P-5'-P-CCNC: 21 U/L (ref 0–45)
BILIRUB SERPL-MCNC: 0.4 MG/DL (ref 0.2–1.3)
BUN SERPL-MCNC: 17 MG/DL (ref 7–30)
CALCIUM SERPL-MCNC: 8.7 MG/DL (ref 8.5–10.1)
CHLORIDE BLD-SCNC: 113 MMOL/L (ref 94–109)
CO2 SERPL-SCNC: 23 MMOL/L (ref 20–32)
CREAT SERPL-MCNC: 1.32 MG/DL (ref 0.52–1.04)
FERRITIN SERPL-MCNC: 39 NG/ML (ref 8–252)
GFR SERPL CREATININE-BSD FRML MDRD: 46 ML/MIN/1.73M2
GLUCOSE BLD-MCNC: 88 MG/DL (ref 70–99)
IRON SATN MFR SERPL: 33 % (ref 15–46)
IRON SERPL-MCNC: 98 UG/DL (ref 35–180)
POTASSIUM BLD-SCNC: 4 MMOL/L (ref 3.4–5.3)
PROT SERPL-MCNC: 7 G/DL (ref 6.8–8.8)
SODIUM SERPL-SCNC: 141 MMOL/L (ref 133–144)
TIBC SERPL-MCNC: 295 UG/DL (ref 240–430)
TSH SERPL DL<=0.005 MIU/L-ACNC: 1.9 MU/L (ref 0.4–4)

## 2021-12-06 RX ORDER — CHOLECALCIFEROL (VITAMIN D3) 50 MCG
1 TABLET ORAL DAILY
Qty: 90 TABLET | Refills: 3 | Status: SHIPPED | OUTPATIENT
Start: 2021-12-06 | End: 2022-12-21

## 2021-12-15 ENCOUNTER — OFFICE VISIT (OUTPATIENT)
Dept: FAMILY MEDICINE | Facility: CLINIC | Age: 51
End: 2021-12-15
Payer: COMMERCIAL

## 2021-12-15 VITALS
WEIGHT: 150 LBS | RESPIRATION RATE: 16 BRPM | OXYGEN SATURATION: 99 % | SYSTOLIC BLOOD PRESSURE: 120 MMHG | TEMPERATURE: 97 F | HEIGHT: 64 IN | HEART RATE: 56 BPM | DIASTOLIC BLOOD PRESSURE: 82 MMHG | BODY MASS INDEX: 25.61 KG/M2

## 2021-12-15 DIAGNOSIS — M79.604 BILATERAL LEG PAIN: Primary | ICD-10-CM

## 2021-12-15 DIAGNOSIS — M79.605 BILATERAL LEG PAIN: Primary | ICD-10-CM

## 2021-12-15 DIAGNOSIS — M54.2 NECK PAIN: ICD-10-CM

## 2021-12-15 PROCEDURE — 99213 OFFICE O/P EST LOW 20 MIN: CPT | Performed by: INTERNAL MEDICINE

## 2021-12-15 ASSESSMENT — PATIENT HEALTH QUESTIONNAIRE - PHQ9: SUM OF ALL RESPONSES TO PHQ QUESTIONS 1-9: 11

## 2021-12-15 ASSESSMENT — PAIN SCALES - GENERAL: PAINLEVEL: SEVERE PAIN (6)

## 2021-12-15 ASSESSMENT — MIFFLIN-ST. JEOR: SCORE: 1275.4

## 2021-12-15 NOTE — PROGRESS NOTES
"  Assessment & Plan     Bilateral leg pain  Neck pain  Reviewed w/u including labs/imaging.  She has not yet started vitamins, recommend start this:  Please take iron (ferrous gluconate) three times a week (Monday, Wednesday, Friday)  Please take vitamin D3 2000 units day  Please take vitamin b12 1000mcg orally daily.    She has PT scheduled later this month.    Recommend follow-up four weeks for recheck, sooner if needed.    Return in about 4 weeks (around 1/12/2022) for Follow up, with me, in person, sooner if symptoms worsen or do not improve.    DO MANUEL Julio [facility] MEREDITH Bueno is a 51 year old who presents for the following health issues    HPI     Ximena presents for follow-up.  She continues to have stable leg/neck pain.  Admits pharmacy has not filled her vitamin prescriptions yet (discussed on lab results note) so has not started, we discussed picking up OTC to avoid delay in care. She is agreeable.  She has already scheduled PT appointments later this month.   She would like to review neck xray results today.    Review of Systems   Constitutional, HEENT, cardiovascular, pulmonary, gi and gu systems are negative, except as otherwise noted.      Objective    /82 (BP Location: Left arm, Patient Position: Chair, Cuff Size: Adult Large)   Pulse 56   Temp 97  F (36.1  C) (Tympanic)   Resp 16   Ht 1.626 m (5' 4\")   Wt 68 kg (150 lb)   SpO2 99%   Breastfeeding No   BMI 25.75 kg/m    Body mass index is 25.75 kg/m .  Physical Exam     GENERAL APPEARANCE: AAOx3, no distress. Well developed.      PSYCH: appropriate mood and affect.           "

## 2021-12-22 NOTE — PROGRESS NOTES
Ximena Bess is a 51 year old female who is being evaluated via a billable telephone visit.      What phone number would you like to be contacted at? [phone]  How would you like to obtain your AVS? Mendy    SUBJECTIVE:                                                   Ximena Bess is a 51 year old female who presents for virtual visit today for the following health issue(s):  Patient presents with:  Recheck Medication: F/u to Lexapro 20mg and Wellbutrin 300mg, pt feels she has been struggling more with depression recently. Wondering if dose could be increased.    HPI:  Patient is doing a phone visit to f/u on depression and less so anxiety as well as just trying to put all of her diffferent health issues together into one picture for her b/c feels like she's had a lot of different health issues recently and no clear picture of what is going on.    Has had depression for quite a few years and realizing now that all of her fatigue is likely partly that as well. Has some B12 def though neg GI work up but does shots once a month or falls in the low 200s. Has had some other vitamin deficiencies etc but recently has been normal range and yet still fatigue and depression and anehdonia. Denies suicidal ideation but definitely feels hopeless that things will get better and that there's an answer to to health issues or her social/family/friend/job issues. Is on wellbutrin at 300mg and discussed maybe bumping it to 450mg when had her annual 1/21. She is on lexapro at 20mg and anxiety Is fairly well controlled. Discussed increase in that at her last annual also. Calling today to see if one of those is a better option    Also was doing her vivelle patch for v.m sx and decided to stop it b/c rx ran out. Was doing ok the first month or two but actually recently is having some increase in her hot flashes and night sweats. Still milder than used to be and less long lasting and not every day. But definitely not  "sleeping as well since stopping her patch and mood feels like it may have worsened around then too, though also around time of winter/daylight decrease, etc    Also is struggling b/c has a +MARIBELL but no formal autoimmune dx. Has the kidney issues she's seen Dr. Vergara for. Felt to be PPI and advil related so stopped them and creat/GFR did improve some but not back to normal and still lower GFR and higher creat. Except now also has more joint and body pains in general and can't take an nsaid. Has some reflux/heart burn but does tums if bad and trying to adjust diet to help as well. Feels like there's no explanation for her pains and then feels like all of her health impacts her overall mood and \"I want to feel good and get back to being pain free and doing things but no one has an answer for me\"    Patient then reports that with her pain and her depression \"One drink then becomes two drinks and I don't want to do that\"    No LMP recorded. Patient has had a hysterectomy.    Patient is not sexually active, .  Using hysterectomy for contraception.    reports that she has never smoked. She has never used smokeless tobacco.    Health maintenance updated:  yes    Today's PHQ-2 Score:   PHQ-2 (  [employer]) 2021   Q1: Little interest or pleasure in doing things 2   Q2: Feeling down, depressed or hopeless 2   PHQ-2 Score 4   PHQ-2 Total Score (12-17 Years)- Positive if 3 or more points; Administer PHQ-A if positive -   Q1: Little interest or pleasure in doing things -   Q2: Feeling down, depressed or hopeless -   PHQ-2 Score -     Today's PHQ-9 Score:   PHQ-9 SCORE 2021   PHQ-9 Total Score MyChart -   PHQ-9 Total Score 15     Today's EDVIN-7 Score:   EDVIN-7 SCORE 2021   Total Score 13       Problem list and histories reviewed & adjusted, as indicated.  Additional history: as documented.    Patient Active Problem List   Diagnosis     Depression, major, recurrent, moderate (H)     Vasomotor symptoms due to " menopause     S/P total hysterectomy and bilateral salpingo-oophorectomy     Elevated serum creatinine     Migraine with aura and without status migrainosus, not intractable     Vitamin B12 deficiency (non anemic)     Esophagitis determined by biopsy     Stage 3a chronic kidney disease (H)     Bilateral leg pain     Essential (primary) hypertension     Positive MARIBELL (antinuclear antibody)     Nephritis interstitial chronic     Post-menopause on HRT (hormone replacement therapy)     Anxiety     Past Surgical History:   Procedure Laterality Date     BIOPSY  Mar 2021     COLONOSCOPY  Jan 2020     HYSTERECTOMY VAGINAL, BILATERAL SALPINGO-OOPHERECTOMY, COMBINED  2003     TONSILLECTOMY        Social History     Tobacco Use     Smoking status: Never Smoker     Smokeless tobacco: Never Used   Substance Use Topics     Alcohol use: Yes     Alcohol/week: 0.0 standard drinks      Problem (# of Occurrences) Relation (Name,Age of Onset)    Asthma (2) Sister (Karen), Daughter (Oni)    Cerebrovascular Disease (1) Daughter (Sindy)    Colon Cancer (1) Father (Dad)    Depression (1) Sister (Krystal)    Lactose Intolerance (1) Father (Dad, 55)    No Known Problems (6) Mother, Brother, Maternal Grandmother, Maternal Grandfather, Paternal Grandmother, Other    Prostate Cancer (1) Father (Dad)            Current Outpatient Medications   Medication Sig     buPROPion (WELLBUTRIN XL) 150 MG 24 hr tablet Take 1 tablet (150 mg) by mouth every morning In addition to the 300mg for a total of 450mg qam     buPROPion (WELLBUTRIN XL) 300 MG 24 hr tablet TAKE 1 TABLET BY MOUTH DAILY EVERY MORNING     carvedilol (COREG) 6.25 MG tablet TAKE 1 TABLET(6.25 MG) BY MOUTH TWICE DAILY     cyanocobalamin (CYANOCOBALAMIN) 1000 MCG/ML injection Inject 1 mL (1,000 mcg) into the muscle every 30 days     escitalopram (LEXAPRO) 20 MG tablet Take 1 tablet (20 mg) by mouth daily     Fexofenadine HCl (ALLEGRA PO)      iron (FERROUS GLUCONATE) 256 (28 Fe) MG tablet  "Take 1 tablet (100 mg) by mouth daily     Needle, Disp, 25G X 1-1/2\" MISC Use as directed with syringe monthly for B12 injections     syringe, disposable, 1 ML MISC Use with needle as directed for monthly B12 injections     topiramate (TOPAMAX) 50 MG tablet Take 1 tablet (50 mg) by mouth 2 times daily     vitamin B-12 (CYANOCOBALAMIN) 1000 MCG CR tablet Take 1 tablet (1,000 mcg) by mouth daily     vitamin D3 (CHOLECALCIFEROL) 50 mcg (2000 units) tablet Take 1 tablet (50 mcg) by mouth daily     Current Facility-Administered Medications   Medication     cyanocobalamin injection 1,000 mcg     cyanocobalamin injection 1,000 mcg     Allergies   Allergen Reactions     Seasonal Allergies          OBJECTIVE:     No vitals were obtained today due to virtual visit.    Physical Exam    NEURO: Cranial nerves grossly intact.  Mentation and speech appropriate for age.  PSYCH: Mentation appears normal, affect normal/bright, judgement and insight intact, normal speech THOUGH SOMEWHAT SLOWED WHILE COMMUNICATING SOME RECENT MEDICAL ISSUES      ASSESSMENT/PLAN:                                                      Phone call duration: 35 minutes with an additional 10 minutes on chart review of outside imaging, labs and clinic visit notes from other providers      ICD-10-CM    1. Depression, major, recurrent, moderate (H)  F33.1 buPROPion (WELLBUTRIN XL) 150 MG 24 hr tablet   2. Anxiety  F41.9    3. Vasomotor symptoms due to menopause  N95.1    4. Post-menopause on HRT (hormone replacement therapy)  Z79.890    5. Nephritis interstitial chronic  N11.9    6. Arthralgia, unspecified joint  M25.50          Patient does have a complicated mental health and general health history but has been battling depression and less so anxiety essentially since started seeing me and  and move back to [place]  Was doing somewhat better on lexapro and wellbutrin for awhile but now worsening again and likely multifactorial    After discussing all part of " her history, plan is as such    1)has vivelle patches left of the 0.05mg and will restart on them. Do that for 2-4 weeks and see if v.m sx are improved, sleeping better, and even if joint pains and mood are improved in anyway with just that alone    2)once has given that enough time to see how she is doing, if completely better with that alone then will do just that and reviewed pros/cons/safety of ERT  If some things are better and other's are not then would likely bump her wellbutrin to 450mg from 300mg as first line. Give that 4-6 weeks and see how she's doing. Reviewed insomnia, dreams, agitation, etc    3)could consider increase to 30mg lexapro but don't feel this is likely of enough benefit for depression vs anxiety and feel it would cause more flattening and side effects rather than dramatic improvement    Though unclear if her body pains/joint pains are at all related to the MARIBELL or just some of her cervical spine issues and also needs imaging/eval for her bilateral leg pain issue as cervical vertebral bodies wouldn't cause that, and simply not being able to just take nsaids now, it could be possibly related. Will work on phys therapy and then could consider reeval with rheum or spine ortho.  However would change from lexapro to cymbalta rather than increase lexapro as next step  Mood may benefit more from SNRI vs selective serotonin reuptake inhibitor and cymbalta has approval for chronic pain which may help her in 2 ways    Encouraged her to keep following up with her new PCP and Dr. Vergara. Quite possible that the nephritis started with NSAIDS and PPI but just hasn't fully recovered/resolved and this is her new baseline. However if no further worsening than that is reassuring at least    Patient already has her annual with me on 1/7. At that point will have time to have done about 2 weeks of vivelle restart and possible very early in bumping of her wellbutrin and then will discuss further from there  Has  enough patches at this point to last until annual while we consider options  Will send an Rx for additional 150mg wellbutrin to add to the 300mg until long term plan determined    Muna Ramos MD  [facility] FOR [facility]

## 2021-12-24 ENCOUNTER — VIRTUAL VISIT (OUTPATIENT)
Dept: OBGYN | Facility: CLINIC | Age: 51
End: 2021-12-24
Payer: COMMERCIAL

## 2021-12-24 DIAGNOSIS — F41.9 ANXIETY: ICD-10-CM

## 2021-12-24 DIAGNOSIS — Z79.890 POST-MENOPAUSE ON HRT (HORMONE REPLACEMENT THERAPY): ICD-10-CM

## 2021-12-24 DIAGNOSIS — F33.1 DEPRESSION, MAJOR, RECURRENT, MODERATE (H): Primary | ICD-10-CM

## 2021-12-24 DIAGNOSIS — M25.50 ARTHRALGIA, UNSPECIFIED JOINT: ICD-10-CM

## 2021-12-24 DIAGNOSIS — N11.9: ICD-10-CM

## 2021-12-24 DIAGNOSIS — N95.1 VASOMOTOR SYMPTOMS DUE TO MENOPAUSE: ICD-10-CM

## 2021-12-24 PROCEDURE — 99215 OFFICE O/P EST HI 40 MIN: CPT | Mod: 95 | Performed by: OBSTETRICS & GYNECOLOGY

## 2021-12-24 ASSESSMENT — ANXIETY QUESTIONNAIRES
3. WORRYING TOO MUCH ABOUT DIFFERENT THINGS: SEVERAL DAYS
IF YOU CHECKED OFF ANY PROBLEMS ON THIS QUESTIONNAIRE, HOW DIFFICULT HAVE THESE PROBLEMS MADE IT FOR YOU TO DO YOUR WORK, TAKE CARE OF THINGS AT HOME, OR GET ALONG WITH OTHER PEOPLE: SOMEWHAT DIFFICULT
2. NOT BEING ABLE TO STOP OR CONTROL WORRYING: SEVERAL DAYS
5. BEING SO RESTLESS THAT IT IS HARD TO SIT STILL: MORE THAN HALF THE DAYS
6. BECOMING EASILY ANNOYED OR IRRITABLE: NEARLY EVERY DAY
7. FEELING AFRAID AS IF SOMETHING AWFUL MIGHT HAPPEN: MORE THAN HALF THE DAYS
GAD7 TOTAL SCORE: 13
1. FEELING NERVOUS, ANXIOUS, OR ON EDGE: MORE THAN HALF THE DAYS

## 2021-12-24 ASSESSMENT — PATIENT HEALTH QUESTIONNAIRE - PHQ9
SUM OF ALL RESPONSES TO PHQ QUESTIONS 1-9: 15
5. POOR APPETITE OR OVEREATING: MORE THAN HALF THE DAYS

## 2021-12-25 PROBLEM — F41.9 ANXIETY: Status: ACTIVE | Noted: 2021-12-25

## 2021-12-25 PROBLEM — Z79.890 POST-MENOPAUSE ON HRT (HORMONE REPLACEMENT THERAPY): Status: ACTIVE | Noted: 2021-12-25

## 2021-12-25 PROBLEM — N11.9: Status: ACTIVE | Noted: 2021-12-25

## 2021-12-25 RX ORDER — BUPROPION HYDROCHLORIDE 150 MG/1
150 TABLET ORAL EVERY MORNING
Qty: 60 TABLET | Refills: 0 | Status: SHIPPED | OUTPATIENT
Start: 2021-12-25 | End: 2022-04-18

## 2021-12-25 ASSESSMENT — ANXIETY QUESTIONNAIRES: GAD7 TOTAL SCORE: 13

## 2021-12-27 ENCOUNTER — IMMUNIZATION (OUTPATIENT)
Dept: NURSING | Facility: CLINIC | Age: 51
End: 2021-12-27
Payer: COMMERCIAL

## 2021-12-27 PROCEDURE — 0004A PR COVID VAC PFIZER DIL RECON 30 MCG/0.3 ML IM: CPT

## 2021-12-27 PROCEDURE — 91300 PR COVID VAC PFIZER DIL RECON 30 MCG/0.3 ML IM: CPT

## 2022-01-03 ENCOUNTER — MYC MEDICAL ADVICE (OUTPATIENT)
Dept: OBGYN | Facility: CLINIC | Age: 52
End: 2022-01-03
Payer: COMMERCIAL

## 2022-01-03 ENCOUNTER — TELEPHONE (OUTPATIENT)
Dept: OBGYN | Facility: CLINIC | Age: 52
End: 2022-01-03
Payer: COMMERCIAL

## 2022-01-03 NOTE — TELEPHONE ENCOUNTER
Type of Paperwork received: FMLA     Date Rcvd:  1/3/22    Rcvd From (Company name):  [employer]    Provider:  RICHIE Baird on Provider Cart Date:  1/3/2022

## 2022-01-05 NOTE — TELEPHONE ENCOUNTER
Type of Paperwork received:  fmla     Date Rcvd:  1/4/2022    Rcvd From (Company name): [facility]    Provider:  Rachel Baird on Provider Cart Date:  1/5/2022

## 2022-01-06 ENCOUNTER — THERAPY VISIT (OUTPATIENT)
Dept: PHYSICAL THERAPY | Facility: CLINIC | Age: 52
End: 2022-01-06
Attending: INTERNAL MEDICINE
Payer: COMMERCIAL

## 2022-01-06 DIAGNOSIS — M79.605 BILATERAL LEG PAIN: ICD-10-CM

## 2022-01-06 DIAGNOSIS — M54.2 NECK PAIN: Primary | ICD-10-CM

## 2022-01-06 DIAGNOSIS — M79.604 BILATERAL LEG PAIN: ICD-10-CM

## 2022-01-06 PROCEDURE — 97161 PT EVAL LOW COMPLEX 20 MIN: CPT | Mod: GP | Performed by: PHYSICAL THERAPIST

## 2022-01-06 PROCEDURE — 97112 NEUROMUSCULAR REEDUCATION: CPT | Mod: GP | Performed by: PHYSICAL THERAPIST

## 2022-01-06 PROCEDURE — 97110 THERAPEUTIC EXERCISES: CPT | Mod: GP | Performed by: PHYSICAL THERAPIST

## 2022-01-06 NOTE — PROGRESS NOTES
[facility] Initial Evaluation -- Cervical    Evaluation Date: January 6, 2022  Ximena Bess is a 51 year old female with a Neck condition.   Referral: FP  Work mechanical stresses:  -Computer and Prolonged sitting  Employment status: misses  Work 1-2 x/month because of neck pain  Leisure mechanical stresses: daily walk w/ dog about 30 min  Functional disability score (NDI):  See Flowsheet  VAS score (0-10): 7/10, ranges 4-9/10  Patient goals/expectations:  decr pain in neck and exer for strengthening    HISTORY:    Present symptoms:  Pain Center neck w/ radiation across top of shlds and into scap area bilaterally.  HA - Occiput - 3 x/week - 1-5/10, can wake with it and it decreases w/ moving around, othertimes comes on during the day.  Often on days when neck is more painful.  Pain quality (sharp/shooting/stabbing/aching/burning/cramping):   Aching, sharp shooting, stabbing, burning, and cramping..  Paresthesia (yes/no):  N&T bilat hands, L>R    Present since (onset date): Exacerbation past 3 mos.  MD Referral 12-3-2021.   Symptoms (improving/unchanging/worsening):  worse    Symptoms commenced as a result of: Unknown   Condition occurred in the following environment:  Unknown    Symptoms at onset (neck/arm/forearm/headache): unsure  Constant symptoms (neck/arm/forearm/headache): Center Neck  Intermittent symptoms (neck/arm/forearm/headache): Bilat UT, Scap, HA, and N&T in hands    Symptoms are made worse with the following: Sometimes Bending repetitive, Sometimes Sitting work and home, Always Turning avoids driving and time of day - No effect   Symptoms are made better with the following: massage    Disturbed sleep (yes/no): pain wakes her 4-5 x/week  Number of pillows: 1  Sleeping postures (prone/sup/side R/L): R&L SL    Previous episodes (0/1-5/6-10/11+):  Year of first episode:     Previous history: Migraines as child , rare now, no prev neck inj  Previous treatments:  "none    Specific Questions: (as reported by the patient)  Dizziness/Tinnitus/Nausea/Swallowing (pos/neg): dizziness, occas feeling in ears  Gait/Upper Limbs (normal/abnormal): normal   Medications (nil/NSAIDS/anlag/steroids/anticoag/other):  Other - Hormone replacement, Anti-depressants and High blood pressure  Medical allergies:  NKA  General health (excellent/good/fair/poor):  good  Pertinent medical history:  Depression, High blood pressure, Kidney disease, Menopausal, Migraines/Headaches and Calf pain, swelling, warmth  Imaging (None/Xray/MRI/Other):  X-ray - see chart  Recent or major surgery (yes/no): Hysterectomy  Night pain (yes/no): no  Accidents (yes/no): no  Unexplained weight loss (yes/no): no  Barriers at home: none  Other red flags: N&T- hands, Pain at night/rest - neck    EXAMINATION    Posture:   Sitting (good/fair/poor): Fair  Standing (good/fair/poor): good     Protruded head (yes/no): yes -mild    Wry Neck (right/left/nil):  Nil   Relevant (yes/no):       Correction of posture(better/worse/no effect): NE - \"Feels good to have support\"  Other observations:      Neurological:    Motor Deficit:  NA d/t time - check next session   Reflexes:  NA  Sensory Deficit:  NA   Dural signs:  NA    Movement Loss:   Red Mod Min Nil Pain   Protrusion    X Incr neck   Flexion       X  Center Neck   Retraction   X  Incr center UB   Extension   X  Center neck   Lateral flexion R  X   Stretch L neck   Lateral flexion L   X  Stretch R neck   Rotation R        X  R neck   Rotation L   X  Tight and Incr neck pain     Test Movements:   During: produces, abolishes, increases, decreases, no effect, centralizing, peripheralizing  After: better, worse, no better, no worse, no effect, centralized, peripheralized    Pretest symptoms sitting: Bilat neck and UT   Symptoms During Symptoms After ROM increased ROM decreased No Effect   PRO        Rep PRO        RET seated No Effect    No Effect         Rep RET No Effect    No " Effect      X   RET back against wall Increases    No Worse         Rep RET EXT Increases  neck    No Worse    X       Static Tests:   Protrusion:  NA  Flexion:  NA  Retraction:  NA  Extension (sitting/prone/supine):  NA    Other Tests:     Provisional Classification:  Derangement - Bilateral, symmetrical, symptoms above elbow    Principle of Management:  Education:  Posture - Neutral Spine, use of L-roll, affect of posture on spine/pain, no couch, recliner, or propping up on pillows in bed, specificity of exer, centralisation/peripheralisation, and HEP    Equipment provided:  None - Recommended using rolled towel for L-Roll whenever sitting.  Mechanical therapy (Y/N):  Y   Extension principle:  Cerv Retr w/ back against the wall x10 6-8 x/day  Lateral principle:    Flexion principle:     Other:      ASSESSMENT/PLAN:    Patient is a 51 year old female with cervical complaints.    Patient has the following significant findings with corresponding treatment plan.                Diagnosis 1:  Neck Pain  Pain -  hot/cold therapy, manual therapy, self management, education, directional preference exercise and home program  Decreased ROM/flexibility - manual therapy, therapeutic exercise and home program  Decreased strength - therapeutic exercise, therapeutic activities and home program  Decreased function - therapeutic activities and home program  Impaired posture - neuro re-education and home program    Therapy Evaluation Codes:   1) Clinical presentation characteristics are:   Stable/Uncomplicated.  2) Decision-Making    Low complexity using standardized patient assessment instrument and/or measureable assessment of functional outcome.  Cumulative Therapy Evaluation is: Low complexity.    Previous and current functional limitations:  (See Goal Flow Sheet for this information)    Short term and Long term goals: (See Goal Flow Sheet for this information)     Communication ability:  Patient appears to be able to clearly  communicate and understand verbal and written communication and follow directions correctly.  Treatment Explanation - The following has been discussed with the patient:   RX ordered/plan of care  Anticipated outcomes  Possible risks and side effects  This patient would benefit from PT intervention to resume normal activities.   Rehab potential is good.    Frequency:  1 X week, once daily  Duration:  for 8 weeks  Discharge Plan:  Achieve all LTG.  Independent in home treatment program.  Reach maximal therapeutic benefit.    Please refer to the daily flowsheet for treatment today, total treatment time and time spent performing 1:1 timed codes.

## 2022-01-09 PROBLEM — M54.2 NECK PAIN: Status: ACTIVE | Noted: 2022-01-09

## 2022-01-20 ENCOUNTER — THERAPY VISIT (OUTPATIENT)
Dept: PHYSICAL THERAPY | Facility: CLINIC | Age: 52
End: 2022-01-20
Payer: COMMERCIAL

## 2022-01-20 DIAGNOSIS — M54.2 NECK PAIN: ICD-10-CM

## 2022-01-20 PROCEDURE — 97110 THERAPEUTIC EXERCISES: CPT | Mod: GP | Performed by: PHYSICAL THERAPY ASSISTANT

## 2022-01-20 PROCEDURE — 97112 NEUROMUSCULAR REEDUCATION: CPT | Mod: GP | Performed by: PHYSICAL THERAPY ASSISTANT

## 2022-02-16 DIAGNOSIS — F33.1 MODERATE EPISODE OF RECURRENT MAJOR DEPRESSIVE DISORDER (H): ICD-10-CM

## 2022-02-16 RX ORDER — BUPROPION HYDROCHLORIDE 300 MG/1
TABLET ORAL
Qty: 90 TABLET | Refills: 0 | Status: SHIPPED | OUTPATIENT
Start: 2022-02-16 | End: 2022-04-18

## 2022-02-16 NOTE — TELEPHONE ENCOUNTER
"Requested Prescriptions   Pending Prescriptions Disp Refills     buPROPion (WELLBUTRIN XL) 300 MG 24 hr tablet [Pharmacy Med Name: BUPROPION XL 300MG TABLETS] 90 tablet 3     Sig: TAKE 1 TABLET BY MOUTH EVERY MORNING       SSRIs Protocol Failed - 2/16/2022 11:25 AM        Failed - PHQ-9 score less than 5 in past 6 months     Please review last PHQ-9 score.           Passed - Medication is Bupropion     If the medication is Bupropion (Wellbutrin), and the patient is taking for smoking cessation; OK to refill.          Passed - Medication is active on med list        Passed - Patient is age 18 or older        Passed - No active pregnancy on record        Passed - No positive pregnancy test in last 12 months        Passed - Recent (6 mo) or future (30 days) visit within the authorizing provider's specialty     Patient had office visit in the last 6 months or has a visit in the next 30 days with authorizing provider or within the authorizing provider's specialty.  See \"Patient Info\" tab in inbasket, or \"Choose Columns\" in Meds & Orders section of the refill encounter.               Last Written Prescription Date:  1/22/21  Last Fill Quantity: 90,  # refills: 3   Last office visit: 1/22/2021 with prescribing provider:  Dr Ramos   Future Office Visit:   Next 5 appointments (look out 90 days)    Apr 18, 2022  2:45 PM  PHYSICAL with Muna Ramos MD  [facility] [place] ([facility] ) [address] [place] [id number] [phone]         Prescription approved per [facility] Refill Protocol.  Sarah Cedeno RN on 2/16/2022 at 11:47 AM          "

## 2022-03-15 DIAGNOSIS — F33.1 MODERATE EPISODE OF RECURRENT MAJOR DEPRESSIVE DISORDER (H): ICD-10-CM

## 2022-03-15 RX ORDER — ESCITALOPRAM OXALATE 20 MG/1
TABLET ORAL
Qty: 90 TABLET | Refills: 0 | Status: SHIPPED | OUTPATIENT
Start: 2022-03-15 | End: 2022-04-18

## 2022-03-15 NOTE — TELEPHONE ENCOUNTER
"Requested Prescriptions   Pending Prescriptions Disp Refills     escitalopram (LEXAPRO) 20 MG tablet [Pharmacy Med Name: ESCITALOPRAM 20MG TABLETS] 90 tablet 3     Sig: TAKE 1 TABLET BY MOUTH DAILY. GENERIC EQUIVALENT FOR LEXAPRO       SSRIs Protocol Failed - 3/15/2022  9:12 AM        Failed - PHQ-9 score less than 5 in past 6 months     Please review last PHQ-9 score.           Passed - Medication is active on med list        Passed - Patient is age 18 or older        Passed - No active pregnancy on record        Passed - No positive pregnancy test in last 12 months        Passed - Recent (6 mo) or future (30 days) visit within the authorizing provider's specialty     Patient had office visit in the last 6 months or has a visit in the next 30 days with authorizing provider or within the authorizing provider's specialty.  See \"Patient Info\" tab in inbasket, or \"Choose Columns\" in Meds & Orders section of the refill encounter.               \Last Written Prescription Date:  1/22/21  Last Fill Quantity: 90,  # refills: 3   Last office visit: 1/22/2021 with prescribing provider:  Dr Ramos   Future Office Visit:   Next 5 appointments (look out 90 days)    Apr 18, 2022  2:45 PM  PHYSICAL with Muna Ramos MD  [facility] for [facility] ([facility] ) [address] [place] [id number] [phone]         Prescription approved per [facility] Refill Protocol.  Sarah Cedeno RN on 3/15/2022 at 9:41 AM          "

## 2022-03-20 ENCOUNTER — HEALTH MAINTENANCE LETTER (OUTPATIENT)
Age: 52
End: 2022-03-20

## 2022-04-18 ENCOUNTER — OFFICE VISIT (OUTPATIENT)
Dept: OBGYN | Facility: CLINIC | Age: 52
End: 2022-04-18
Payer: COMMERCIAL

## 2022-04-18 ENCOUNTER — ANCILLARY PROCEDURE (OUTPATIENT)
Dept: MAMMOGRAPHY | Facility: CLINIC | Age: 52
End: 2022-04-18
Payer: COMMERCIAL

## 2022-04-18 VITALS
SYSTOLIC BLOOD PRESSURE: 110 MMHG | WEIGHT: 159.8 LBS | BODY MASS INDEX: 29.4 KG/M2 | DIASTOLIC BLOOD PRESSURE: 76 MMHG | HEIGHT: 62 IN | HEART RATE: 62 BPM

## 2022-04-18 DIAGNOSIS — R53.83 FATIGUE, UNSPECIFIED TYPE: ICD-10-CM

## 2022-04-18 DIAGNOSIS — N11.9: ICD-10-CM

## 2022-04-18 DIAGNOSIS — Z13.1 SCREENING FOR DIABETES MELLITUS: ICD-10-CM

## 2022-04-18 DIAGNOSIS — I10 ESSENTIAL (PRIMARY) HYPERTENSION: Chronic | ICD-10-CM

## 2022-04-18 DIAGNOSIS — F41.9 ANXIETY: ICD-10-CM

## 2022-04-18 DIAGNOSIS — Z12.31 VISIT FOR SCREENING MAMMOGRAM: ICD-10-CM

## 2022-04-18 DIAGNOSIS — N95.1 VASOMOTOR SYMPTOMS DUE TO MENOPAUSE: ICD-10-CM

## 2022-04-18 DIAGNOSIS — F33.1 DEPRESSION, MAJOR, RECURRENT, MODERATE (H): ICD-10-CM

## 2022-04-18 DIAGNOSIS — E53.8 B12 DEFICIENCY: ICD-10-CM

## 2022-04-18 DIAGNOSIS — Z13.228 SCREENING FOR METABOLIC DISORDER: ICD-10-CM

## 2022-04-18 DIAGNOSIS — R76.8 POSITIVE ANA (ANTINUCLEAR ANTIBODY): ICD-10-CM

## 2022-04-18 DIAGNOSIS — Z13.21 ENCOUNTER FOR VITAMIN DEFICIENCY SCREENING: ICD-10-CM

## 2022-04-18 DIAGNOSIS — N95.1 SYMPTOMS, SUCH AS FLUSHING, SLEEPLESSNESS, HEADACHE, LACK OF CONCENTRATION, ASSOCIATED WITH THE MENOPAUSE: ICD-10-CM

## 2022-04-18 DIAGNOSIS — Z79.890 POST-MENOPAUSE ON HRT (HORMONE REPLACEMENT THERAPY): ICD-10-CM

## 2022-04-18 DIAGNOSIS — Z01.419 ENCOUNTER FOR GYNECOLOGICAL EXAMINATION WITHOUT ABNORMAL FINDING: Primary | ICD-10-CM

## 2022-04-18 DIAGNOSIS — Z13.0 SCREENING FOR DISORDER OF BLOOD AND BLOOD-FORMING ORGANS: ICD-10-CM

## 2022-04-18 DIAGNOSIS — Z13.29 SCREENING FOR THYROID DISORDER: ICD-10-CM

## 2022-04-18 LAB
ERYTHROCYTE [DISTWIDTH] IN BLOOD BY AUTOMATED COUNT: 12.3 % (ref 10–15)
HBA1C MFR BLD: 4.8 % (ref 0–5.6)
HCT VFR BLD AUTO: 39.5 % (ref 35–47)
HGB BLD-MCNC: 13 G/DL (ref 11.7–15.7)
MCH RBC QN AUTO: 31 PG (ref 26.5–33)
MCHC RBC AUTO-ENTMCNC: 32.9 G/DL (ref 31.5–36.5)
MCV RBC AUTO: 94 FL (ref 78–100)
PLATELET # BLD AUTO: 231 10E3/UL (ref 150–450)
RBC # BLD AUTO: 4.19 10E6/UL (ref 3.8–5.2)
WBC # BLD AUTO: 7.3 10E3/UL (ref 4–11)

## 2022-04-18 PROCEDURE — 77063 BREAST TOMOSYNTHESIS BI: CPT | Mod: TC | Performed by: RADIOLOGY

## 2022-04-18 PROCEDURE — 82607 VITAMIN B-12: CPT | Performed by: OBSTETRICS & GYNECOLOGY

## 2022-04-18 PROCEDURE — 36415 COLL VENOUS BLD VENIPUNCTURE: CPT | Performed by: OBSTETRICS & GYNECOLOGY

## 2022-04-18 PROCEDURE — 77067 SCR MAMMO BI INCL CAD: CPT | Mod: TC | Performed by: RADIOLOGY

## 2022-04-18 PROCEDURE — 82306 VITAMIN D 25 HYDROXY: CPT | Performed by: OBSTETRICS & GYNECOLOGY

## 2022-04-18 PROCEDURE — 85027 COMPLETE CBC AUTOMATED: CPT | Performed by: OBSTETRICS & GYNECOLOGY

## 2022-04-18 PROCEDURE — 84443 ASSAY THYROID STIM HORMONE: CPT | Performed by: OBSTETRICS & GYNECOLOGY

## 2022-04-18 PROCEDURE — 99396 PREV VISIT EST AGE 40-64: CPT | Performed by: OBSTETRICS & GYNECOLOGY

## 2022-04-18 PROCEDURE — 80053 COMPREHEN METABOLIC PANEL: CPT | Performed by: OBSTETRICS & GYNECOLOGY

## 2022-04-18 PROCEDURE — 83036 HEMOGLOBIN GLYCOSYLATED A1C: CPT | Performed by: OBSTETRICS & GYNECOLOGY

## 2022-04-18 PROCEDURE — 99213 OFFICE O/P EST LOW 20 MIN: CPT | Mod: 25 | Performed by: OBSTETRICS & GYNECOLOGY

## 2022-04-18 RX ORDER — ESTRADIOL 0.03 MG/D
1 FILM, EXTENDED RELEASE TRANSDERMAL
Status: CANCELLED | OUTPATIENT
Start: 2022-04-18

## 2022-04-18 RX ORDER — BUPROPION HYDROCHLORIDE 150 MG/1
150 TABLET ORAL EVERY MORNING
Qty: 60 TABLET | Refills: 0 | Status: SHIPPED | OUTPATIENT
Start: 2022-04-18 | End: 2022-05-27

## 2022-04-18 RX ORDER — ESTRADIOL 0.05 MG/D
PATCH, EXTENDED RELEASE TRANSDERMAL
Qty: 24 PATCH | Refills: 3 | Status: SHIPPED | OUTPATIENT
Start: 2022-04-18 | End: 2022-04-25

## 2022-04-18 RX ORDER — ESCITALOPRAM OXALATE 20 MG/1
TABLET ORAL
Qty: 90 TABLET | Refills: 3 | Status: SHIPPED | OUTPATIENT
Start: 2022-04-18 | End: 2022-06-27

## 2022-04-18 RX ORDER — ESTRADIOL 0.03 MG/D
1 FILM, EXTENDED RELEASE TRANSDERMAL
COMMUNITY
End: 2022-04-18

## 2022-04-18 RX ORDER — ESCITALOPRAM OXALATE 10 MG/1
10 TABLET ORAL DAILY
Qty: 60 TABLET | Refills: 0 | Status: SHIPPED | OUTPATIENT
Start: 2022-04-18 | End: 2022-05-27

## 2022-04-18 RX ORDER — BUPROPION HYDROCHLORIDE 300 MG/1
TABLET ORAL
Qty: 90 TABLET | Refills: 0 | Status: SHIPPED | OUTPATIENT
Start: 2022-04-18 | End: 2022-05-27 | Stop reason: DRUGHIGH

## 2022-04-18 ASSESSMENT — ANXIETY QUESTIONNAIRES
2. NOT BEING ABLE TO STOP OR CONTROL WORRYING: SEVERAL DAYS
7. FEELING AFRAID AS IF SOMETHING AWFUL MIGHT HAPPEN: SEVERAL DAYS
3. WORRYING TOO MUCH ABOUT DIFFERENT THINGS: SEVERAL DAYS
1. FEELING NERVOUS, ANXIOUS, OR ON EDGE: SEVERAL DAYS
5. BEING SO RESTLESS THAT IT IS HARD TO SIT STILL: SEVERAL DAYS
GAD7 TOTAL SCORE: 7
IF YOU CHECKED OFF ANY PROBLEMS ON THIS QUESTIONNAIRE, HOW DIFFICULT HAVE THESE PROBLEMS MADE IT FOR YOU TO DO YOUR WORK, TAKE CARE OF THINGS AT HOME, OR GET ALONG WITH OTHER PEOPLE: SOMEWHAT DIFFICULT
6. BECOMING EASILY ANNOYED OR IRRITABLE: MORE THAN HALF THE DAYS

## 2022-04-18 ASSESSMENT — PATIENT HEALTH QUESTIONNAIRE - PHQ9
5. POOR APPETITE OR OVEREATING: NOT AT ALL
SUM OF ALL RESPONSES TO PHQ QUESTIONS 1-9: 15

## 2022-04-18 NOTE — PROGRESS NOTES
Ximena is a 51 year old  female who presents for annual exam.     Besides routine health maintenance, she has no other health concerns today .    HPI:  The patient's PCP is Trish Steele DO.      Patient has a PCP listed as above but really doesn't see anyone but myself.    Had been on ERT right after her momo/bso in  and then stopped for awhile but now has more consistently been on her vivelle 0.05 and all v.m sx are well controlled and would like to continue on them.    Depression and anxiety and especially fatigue continue to be significant the last several years. Have done several meds and tried counseling for a short time. Get a slight amount of improvement but not significant and feels like anxiety is situational to things in the world. The depression is much more consistent and more fatigue, lack of motivation, apathy, etc. Currently on 300mg wellbturin and 20mg lexapro. Tolerating them well but does not feel her sx are adequately controlled    Has known b12 deficiency. Though never severe has not been able to maintain levels with just oral B so does injections and levels then are normal. Also doing other vitamin D and MVI    Also has a known +MARIBELL but went through a w/up for this and no autoimmune condition was ever found. She had an elevated creat/low GFR around the same time. Saw renal and did a kidney bx and it was found to be related to nsaids/PPI use. So stopped those and renal function normal most recently. Still has some reflux but tries to avoid PPI for pepcid/tagamet products instead.    Fasting for labs today    Has HTN and sees cards for that and refills on meds for that.    No urinary issues or abnormal vaginal discharge or any other gyn issues    C.s done 2 yrs ago and recommended repeat in 3 yrs      GYNECOLOGIC HISTORY:    No LMP recorded. Patient has had a hysterectomy.    Her current contraception method is: hysterectomy and not sexually active.  She  reports that she has never  pt is labored breathing states a couple of days  pt has been coughing for days non productive  sent form md yadira hull smoked. She has never used smokeless tobacco.    Patient is not sexually active.  STD testing offered?  Declined  Last PHQ-9 score on record =   PHQ-9 SCORE 2022   PHQ-9 Total Score MyChart 7 (Mild depression)   PHQ-9 Total Score 7     Last GAD7 score on record =   EDVIN-7 SCORE 2022   Total Score 4 (minimal anxiety)   Total Score 4     Alcohol Score = 4    HEALTH MAINTENANCE:  Cholesterol:   Recent Labs   Lab Test 21  1100 20  1140   CHOL 207* 213*   HDL 78 82   * 117*   TRIG 52 71   Last Mammo: One year ago, Result: Normal, Next Mammo: Today  Pap: No longer indicated  Colonoscopy:  2020, Result: Normal, Next Colonoscopy: 1 year.  Dexa:  N/a    Health maintenance updated:  yes    HISTORY:  OB History    Para Term  AB Living   3 3 3 0 0 3   SAB IAB Ectopic Multiple Live Births   0 0 0 0 3      # Outcome Date GA Lbr Jorge/2nd Weight Sex Delivery Anes PTL Lv   3 Term 00    M    SHANNAN   2 Term 97    F    SHANNAN   1 Term 90    F    SHANNAN       Patient Active Problem List   Diagnosis     Depression, major, recurrent, moderate (H)     Symptoms, such as flushing, sleeplessness, headache, lack of concentration, associated with the menopause     S/P total hysterectomy and bilateral salpingo-oophorectomy     Elevated serum creatinine     Migraine with aura and without status migrainosus, not intractable     B12 deficiency     Esophagitis determined by biopsy     Stage 3a chronic kidney disease (H)     Bilateral leg pain     Essential (primary) hypertension     Positive MARIBELL (antinuclear antibody)     Nephritis interstitial chronic     Post-menopause on HRT (hormone replacement therapy)     Anxiety     Neck pain     Inattention     Chronic fatigue     Past Surgical History:   Procedure Laterality Date     BIOPSY  Mar 2021     COLONOSCOPY  2020     HYSTERECTOMY VAGINAL, BILATERAL SALPINGO-OOPHERECTOMY, COMBINED       TONSILLECTOMY        Social History     Tobacco  "Use     Smoking status: Never Smoker     Smokeless tobacco: Never Used   Substance Use Topics     Alcohol use: Yes     Alcohol/week: 0.0 standard drinks      Problem (# of Occurrences) Relation (Name,Age of Onset)    Asthma (2) Sister (Karen), Daughter (Oni)    Cerebrovascular Disease (1) Daughter (Sindy)    Colon Cancer (1) Father (Dad)    Depression (1) Sister (Krystal)    Lactose Intolerance (1) Father (Dad, 55)    No Known Problems (6) Mother, Brother, Maternal Grandmother, Maternal Grandfather, Paternal Grandmother, Other    Prostate Cancer (1) Father (Dad)            Current Outpatient Medications   Medication Sig     carvedilol (COREG) 6.25 MG tablet TAKE 1 TABLET(6.25 MG) BY MOUTH TWICE DAILY     cyanocobalamin (CYANOCOBALAMIN) 1000 MCG/ML injection Inject 1 mL (1,000 mcg) into the muscle every 30 days     escitalopram (LEXAPRO) 20 MG tablet TAKE 1 TABLET BY MOUTH DAILY.     Fexofenadine HCl (ALLEGRA PO)      Needle, Disp, 25G X 1-1/2\" MISC Use as directed with syringe monthly for B12 injections     syringe, disposable, 1 ML MISC Use with needle as directed for monthly B12 injections     buPROPion (WELLBUTRIN XL) 150 MG 24 hr tablet Take 1 tablet (150 mg) by mouth every morning In addition to the 300mg for a total of 450mg qam     buPROPion (WELLBUTRIN XL) 300 MG 24 hr tablet TAKE 1 TABLET BY MOUTH EVERY MORNING     escitalopram (LEXAPRO) 10 MG tablet Take 1 tablet (10 mg) by mouth daily ALONG WITH THE 20MG FOR A TOTAL OF 30MG PO QDAY     estradiol (VIVELLE-DOT) 0.05 MG/24HR bi-weekly patch PLACE 1 PATCH TOPICALLY ONTO CLEAN SKIN TWICE WEEKLY     topiramate (TOPAMAX) 50 MG tablet Take 1 tablet (50 mg) by mouth 2 times daily     vitamin B-12 (CYANOCOBALAMIN) 1000 MCG CR tablet Take 1 tablet (1,000 mcg) by mouth daily (Patient not taking: No sig reported)     vitamin D3 (CHOLECALCIFEROL) 50 mcg (2000 units) tablet Take 1 tablet (50 mcg) by mouth daily     Current Facility-Administered Medications " "  Medication     cyanocobalamin injection 1,000 mcg     cyanocobalamin injection 1,000 mcg     Allergies   Allergen Reactions     Seasonal Allergies        Past medical, surgical, social and family histories were reviewed and updated in EPIC.    ROS:   12 point review of systems negative other than symptoms noted below or in the HPI.  No urinary frequency or dysuria, bladder or kidney problems    EXAM:  /76   Pulse 62   Ht 1.581 m (5' 2.25\")   Wt 72.5 kg (159 lb 12.8 oz)   Breastfeeding No   BMI 28.99 kg/m     BMI: Body mass index is 28.99 kg/m .    PHYSICAL EXAM:  Constitutional:   Appearance: Well nourished, well developed, alert, in no acute distress  Neck:  Lymph Nodes:  No lymphadenopathy present    Thyroid:  Gland size normal, nontender, no nodules or masses present  on palpation  Chest:  Respiratory Effort:  Breathing unlabored, CTA bilaterally  Cardiovascular:    Heart: Auscultation:  Regular rate, normal rhythm, no murmurs present  Breasts: Palpation of Breasts and Axillae:  No masses present on palpation, no breast tenderness., Axillary Lymph Nodes:  No lymphadenopathy present. and No nodularity, asymmetry or nipple discharge bilaterally.  Gastrointestinal:   Abdominal Examination:  Abdomen nontender to palpation, tone normal without rigidity or guarding, no masses present, umbilicus without lesions   Liver and Spleen:  No hepatomegaly present, liver nontender to palpation    Hernias:  No hernias present  Lymphatic: Lymph Nodes:  No other lymphadenopathy present  Skin:  General Inspection:  No rashes present, no lesions present, no areas of  discoloration  Neurologic:    Mental Status:  Oriented X3.  Normal strength and tone, sensory exam                grossly normal, mentation intact and speech normal.    Psychiatric:   Mentation appears normal and affect normal/bright.         Pelvic Exam:  External Genitalia:     Normal appearance for age, no discharge present, no tenderness present, no " inflammatory lesions present, color normal  Vagina:     Normal vaginal vault without central or paravaginal defects, no discharge present, no inflammatory lesions present, no masses present  Bladder:     Nontender to palpation  Urethra:   Urethral Body:  Urethra palpation normal, urethra structural support normal   Urethral Meatus:  No erythema or lesions present  Cervix:     Surgically absent  Uterus:     Surgically absent  Adnexa:     Surgically absent  Perineum:     Perineum within normal limits, no evidence of trauma, no rashes or skin lesions present  Anus:     Anus within normal limits, no hemorrhoids present  Inguinal Lymph Nodes:     No lymphadenopathy present    COUNSELING:   Reviewed preventive health counseling, as reflected in patient instructions  Special attention given to:        depression/anxiety/fatigue       Colorectal Cancer Screening       (Darlene)menopause management    BMI: Body mass index is 28.99 kg/m .  Weight management plan: Discussed healthy diet and exercise guidelines    ASSESSMENT:  51 year old female with satisfactory annual exam.    ICD-10-CM    1. Encounter for gynecological examination without abnormal finding  Z01.419    2. Depression, major, recurrent, moderate (H)  F33.1 DISCONTINUED: buPROPion (WELLBUTRIN XL) 150 MG 24 hr tablet   3. Anxiety  F41.9 DISCONTINUED: escitalopram (LEXAPRO) 10 MG tablet   4. Nephritis interstitial chronic  N11.9    5. Essential (primary) hypertension  I10    6. Positive MARIBELL (antinuclear antibody)  R76.8    7. Symptoms, such as flushing, sleeplessness, headache, lack of concentration, associated with the menopause  N95.1 DISCONTINUED: estradiol (VIVELLE-DOT) 0.05 MG/24HR bi-weekly patch   8. Post-menopause on HRT (hormone replacement therapy)  Z79.890    9. Fatigue, unspecified type  R53.83    10. B12 deficiency  E53.8 Vitamin B12     Vitamin B12   11. Vasomotor symptoms due to menopause  N95.1    12. Screening for metabolic disorder  Z13.228  Comprehensive metabolic panel   13. Screening for thyroid disorder  Z13.29 TSH with free T4 reflex   14. Encounter for vitamin deficiency screening  Z13.21 Vitamin D Deficiency   15. Screening for disorder of blood and blood-forming organs  Z13.0 CBC with platelets   16. Screening for diabetes mellitus  Z13.1 Hemoglobin A1c       PLAN:  Pap is no longer indicated d/t MAURICE/BSO  mammo today  C.s is UTD until next year    Fasting labs today    Reviewed ERT vs HRT with all the pros/cons/risks/side effects. Patient is young and menopausal quite young and has good control of v.m sx on vivelle at low dose. Would like to continue with it and refills sent for the year  Will refill B12 injections when due for that and check a B12 level with her labs today    Discussed her depression, anxiety, fatigue again in detail. Has been struggling most with depression and fatigue since getting on to lexapro which controlled her anxiety fairly well and more so when got to 20mg.  However the other components are really a struggle.   Reviewed medication options with increasing lexapro to 30mg, increased wellbutrin to 450mg(may have already done this??), switching lexapro to an SNRI, resuming counseling/therapy, and referral to psych MD to maximize medication options that would be outside of my comfort zone  At this point she feels most comfortable seeing me and continuing to work on optimizing meds with myself as she feels most comfortable and like I know her better.  After weighing pros/cons of options we will increase lexapro to 30mg, continue/increase wellbutrin to 450mg and f/up with me in 6 weeks  Reviewed pros/cons and side effects she can expect.  If at f/u she is not improved we would at that point likely switch to SNRI from the lexapro but leave the wellbutrin  Again encouraged her to contact a therapist that is not through [employer] at work and find someone she connects with to work on some of her other stressors with family, divorce,  etc.    On the same date of the encounter, an additional 20 minutes, on top of the preventative annual exam, were spent on reviewing imaging, lab work, and other provider notes, along with direct management of the patient's medical issues as above.      Muna Ramos MD

## 2022-04-19 LAB
ALBUMIN SERPL-MCNC: 4.1 G/DL (ref 3.4–5)
ALP SERPL-CCNC: 91 U/L (ref 40–150)
ALT SERPL W P-5'-P-CCNC: 37 U/L (ref 0–50)
ANION GAP SERPL CALCULATED.3IONS-SCNC: 10 MMOL/L (ref 3–14)
AST SERPL W P-5'-P-CCNC: 26 U/L (ref 0–45)
BILIRUB SERPL-MCNC: 0.5 MG/DL (ref 0.2–1.3)
BUN SERPL-MCNC: 14 MG/DL (ref 7–30)
CALCIUM SERPL-MCNC: 9 MG/DL (ref 8.5–10.1)
CHLORIDE BLD-SCNC: 110 MMOL/L (ref 94–109)
CO2 SERPL-SCNC: 20 MMOL/L (ref 20–32)
CREAT SERPL-MCNC: 1.16 MG/DL (ref 0.52–1.04)
GFR SERPL CREATININE-BSD FRML MDRD: 56 ML/MIN/1.73M2
GLUCOSE BLD-MCNC: 69 MG/DL (ref 70–99)
POTASSIUM BLD-SCNC: 3.8 MMOL/L (ref 3.4–5.3)
PROT SERPL-MCNC: 7.4 G/DL (ref 6.8–8.8)
SODIUM SERPL-SCNC: 140 MMOL/L (ref 133–144)
TSH SERPL DL<=0.005 MIU/L-ACNC: 2.37 MU/L (ref 0.4–4)
VIT B12 SERPL-MCNC: 400 PG/ML (ref 193–986)

## 2022-04-19 ASSESSMENT — ANXIETY QUESTIONNAIRES: GAD7 TOTAL SCORE: 7

## 2022-04-20 LAB — DEPRECATED CALCIDIOL+CALCIFEROL SERPL-MC: 31 UG/L (ref 20–75)

## 2022-04-21 NOTE — RESULT ENCOUNTER NOTE
Ximena,    So you blood counts and hemoglobin are great. Your B12 is normal and more mid range than it sometimes can be which is good, and your thyroid looks great.    Your vitamin D is technically normal though could be better. Ideal is 40-50 and you're at 31. I know we talked about it but I can't remember if you're taking vitamin D or not right now. If you are, and are doing 2000 international unit(s)/day then you could definitely double up and do 4000/day. If you're not taking any then definitely do at least 2000/day and plan to just stick with it winter and summer both regardless.    Your metabolic panel is ok. Not worried about the chloride. It's likely more just some lab range of error. Your creatinine is still a little high and your GFR is still a little low, but both are improved compared to when we last checked them. I don't recall if the nephrologist checked them at some point in between, and how the numbers from the 18th compare to when you checked last with him, but overall it seems to be trending the right direction.    Your A1C checks how your sugars have been overall running in the last 3 months and you're great at 4.8% so no trend to high sugars/diabetes at all.    Hopefully our medication adjustments and some increased sun will help you feel better and we'll connect in a few weeks to see how it's going!    Muna Ramos MD

## 2022-04-25 DIAGNOSIS — G43.109 MIGRAINE WITH AURA AND WITHOUT STATUS MIGRAINOSUS, NOT INTRACTABLE: ICD-10-CM

## 2022-04-25 DIAGNOSIS — N95.1 SYMPTOMS, SUCH AS FLUSHING, SLEEPLESSNESS, HEADACHE, LACK OF CONCENTRATION, ASSOCIATED WITH THE MENOPAUSE: ICD-10-CM

## 2022-04-25 RX ORDER — ESTRADIOL 0.05 MG/D
PATCH, EXTENDED RELEASE TRANSDERMAL
Qty: 24 PATCH | Refills: 3 | Status: SHIPPED | OUTPATIENT
Start: 2022-04-25 | End: 2023-05-03

## 2022-04-25 RX ORDER — TOPIRAMATE 50 MG/1
50 TABLET, FILM COATED ORAL 2 TIMES DAILY
Qty: 180 TABLET | Refills: 3 | Status: SHIPPED | OUTPATIENT
Start: 2022-04-25 | End: 2022-06-27

## 2022-04-25 NOTE — TELEPHONE ENCOUNTER
"Requested Prescriptions   Pending Prescriptions Disp Refills     estradiol (VIVELLE-DOT) 0.05 MG/24HR bi-weekly patch 24 patch 3     Sig: PLACE 1 PATCH TOPICALLY ONTO CLEAN SKIN TWICE WEEKLY       Hormone Replacement Therapy Passed - 4/25/2022 12:12 PM        Passed - Blood pressure under 140/90 in past 12 months     BP Readings from Last 3 Encounters:   04/18/22 110/76   12/15/21 120/82   12/03/21 105/74                 Passed - Recent (12 mo) or future (30 days) visit within the authorizing provider's specialty     Patient has had an office visit with the authorizing provider or a provider within the authorizing providers department within the previous 12 mos or has a future within next 30 days. See \"Patient Info\" tab in inbasket, or \"Choose Columns\" in Meds & Orders section of the refill encounter.              Passed - Patient has mammogram in past 2 years on file if age 50-75        Passed - Medication is active on med list        Passed - Patient is 18 years of age or older        Passed - No active pregnancy on record        Passed - No positive pregnancy test on record in past 12 months           topiramate (TOPAMAX) 50 MG tablet 180 tablet 3     Sig: Take 1 tablet (50 mg) by mouth 2 times daily       Anti-Seizure Meds Protocol  Failed - 4/25/2022 12:12 PM        Failed - Review Authorizing provider's last note.      Refer to last progress notes: confirm request is for original authorizing provider (cannot be through other providers).          Passed - Recent (12 mo) or future (30 days) visit within the authorizing provider's specialty     Patient has had an office visit with the authorizing provider or a provider within the authorizing providers department within the previous 12 mos or has a future within next 30 days. See \"Patient Info\" tab in inbasket, or \"Choose Columns\" in Meds & Orders section of the refill encounter.              Passed - Normal CBC on file in past 26 months     Recent Labs   Lab Test " 04/18/22  1627   WBC 7.3   RBC 4.19   HGB 13.0   HCT 39.5                    Passed - Normal ALT or AST on file in past 26 months     Recent Labs   Lab Test 04/18/22  1627   ALT 37     Recent Labs   Lab Test 04/18/22  1627   AST 26             Passed - Normal platelet count on file in past 26 months     Recent Labs   Lab Test 04/18/22  1627                  Passed - Medication is active on med list        Passed - No active pregnancy on record        Passed - No positive pregnancy test in last 12 months           Prescription approved per [facility] Refill Protocol.  Mindi Jennings RN on 4/25/2022 at 12:23 PM

## 2022-04-26 ENCOUNTER — TELEPHONE (OUTPATIENT)
Dept: OBGYN | Facility: CLINIC | Age: 52
End: 2022-04-26
Payer: COMMERCIAL

## 2022-04-26 NOTE — TELEPHONE ENCOUNTER
After 21 mins of hold time was able to speak with [facility] pharmacy rep    Verified Topamax rx  Mindi Jennings RN on 4/26/2022 at 9:59 AM

## 2022-04-29 ENCOUNTER — VIRTUAL VISIT (OUTPATIENT)
Dept: OBGYN | Facility: CLINIC | Age: 52
End: 2022-04-29
Payer: COMMERCIAL

## 2022-04-29 DIAGNOSIS — F41.9 ANXIETY: ICD-10-CM

## 2022-04-29 DIAGNOSIS — F33.1 DEPRESSION, MAJOR, RECURRENT, MODERATE (H): ICD-10-CM

## 2022-04-29 DIAGNOSIS — R41.0 CONFUSION AND DISORIENTATION: Primary | ICD-10-CM

## 2022-04-29 PROCEDURE — 99213 OFFICE O/P EST LOW 20 MIN: CPT | Mod: TEL | Performed by: OBSTETRICS & GYNECOLOGY

## 2022-04-29 NOTE — PROGRESS NOTES
Ximena Bess is a 52 year old female who is being evaluated via a billable telephone visit.      What phone number would you like to be contacted at? [phone]  How would you like to obtain your AVS? Mendy    SUBJECTIVE:                                                   Ximena Bess is a 52 year old female who presents for virtual visit today for the following health issue(s):  Patient presents with:  Recheck Medication: F/u to Lexapro increase from 10mg to 20mg a few days ago. Pt states she had an episode where she blacked out on the first day of the new increased dose. Pt has continued this medication and reports no other side effects or concerns since then.    HPI:  Patient had a visit to adjust depression and anxiety meds about a week ago and we went up on her lexapro from 20mg to 30mg. She had an episode on the first day of this where she was riding the bus home from work as she always does and doesn't remember anything after getting on the bus. She got on and at some point got off at her right stop but doesn't remember it, doesn't know how long she was just standing outside, but all the sudden just realized she was standing outside of her apt. [place] disoriented and confused. Never passed out that she knows, never had an injury, no HA or CP or heart palps leading up to it. Was able to get inside and drank some soda/juice and then was able to go and take her dog out for a walk but felt spaced out for a few hours after that. After walking the dog she had something to eat and rested on the couch and eventually started to feel better.    Has now taken her increased cheng dose for 2 additional days and nothing like that has happened again.    When asked details of the rest of the day she admits she likely hadn't had anything much to eat for at least 6+ hours at that point. She admits she hadn't had hardly any water at all most of the day. Bus ride is about 30 minutes or so and other than walking from work  to the bus and then walking from bus stop to her apt she did sit for extended periods of time that day. 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    Other than that she is feeling fine the last two days and is wanting to stick with the med increase as long as this episode isn't of concern    No LMP recorded. Patient has had a hysterectomy.    Patient is not sexually active, .  Using hysterectomy for contraception.    reports that she has never smoked. She has never used smokeless tobacco.    Health maintenance updated:  yes    Today's PHQ-2 Score:   PHQ-2 (  [employer]) 2022   Q1: Little interest or pleasure in doing things 1   Q2: Feeling down, depressed or hopeless 2   PHQ-2 Score 3   PHQ-2 Total Score (12-17 Years)- Positive if 3 or more points; Administer PHQ-A if positive -   Q1: Little interest or pleasure in doing things More than half the days   Q2: Feeling down, depressed or hopeless More than half the days   PHQ-2 Score 4     Today's PHQ-9 Score:   PHQ-9 SCORE 2022   PHQ-9 Total Score MyChart 7 (Mild depression)   PHQ-9 Total Score 7     Today's EDVIN-7 Score:   EDVIN-7 SCORE 2022   Total Score 4 (minimal anxiety)   Total Score 4       Problem list and histories reviewed & adjusted, as indicated.  Additional history: as documented.    Patient Active Problem List   Diagnosis     Depression, major, recurrent, moderate (H)     Symptoms, such as flushing, sleeplessness, headache, lack of concentration, associated with the menopause     S/P total hysterectomy and bilateral salpingo-oophorectomy     Elevated serum creatinine     Migraine with aura and without status migrainosus, not intractable     B12 deficiency     Esophagitis determined by biopsy     Stage 3a chronic kidney disease (H)     Bilateral leg pain     Essential (primary) hypertension     Positive MARIBELL (antinuclear antibody)     Nephritis interstitial chronic      "Post-menopause on HRT (hormone replacement therapy)     Anxiety     Neck pain     Inattention     Chronic fatigue     Past Surgical History:   Procedure Laterality Date     BIOPSY  Mar 2021     COLONOSCOPY  Jan 2020     HYSTERECTOMY VAGINAL, BILATERAL SALPINGO-OOPHERECTOMY, COMBINED  2003     TONSILLECTOMY        Social History     Tobacco Use     Smoking status: Never Smoker     Smokeless tobacco: Never Used   Substance Use Topics     Alcohol use: Yes     Alcohol/week: 0.0 standard drinks      Problem (# of Occurrences) Relation (Name,Age of Onset)    Asthma (2) Sister (Karen), Daughter (Oni)    Cerebrovascular Disease (1) Daughter (Sindy)    Colon Cancer (1) Father (Dad)    Depression (1) Sister (Krystal)    Lactose Intolerance (1) Father (Dad, 55)    No Known Problems (6) Mother, Brother, Maternal Grandmother, Maternal Grandfather, Paternal Grandmother, Other    Prostate Cancer (1) Father (Dad)            Current Outpatient Medications   Medication Sig     buPROPion (WELLBUTRIN XL) 150 MG 24 hr tablet Take 1 tablet (150 mg) by mouth every morning In addition to the 300mg for a total of 450mg qam     buPROPion (WELLBUTRIN XL) 300 MG 24 hr tablet TAKE 1 TABLET BY MOUTH EVERY MORNING     carvedilol (COREG) 6.25 MG tablet TAKE 1 TABLET(6.25 MG) BY MOUTH TWICE DAILY     cyanocobalamin (CYANOCOBALAMIN) 1000 MCG/ML injection Inject 1 mL (1,000 mcg) into the muscle every 30 days     escitalopram (LEXAPRO) 10 MG tablet Take 1 tablet (10 mg) by mouth daily ALONG WITH THE 20MG FOR A TOTAL OF 30MG PO QDAY     escitalopram (LEXAPRO) 20 MG tablet TAKE 1 TABLET BY MOUTH DAILY.     estradiol (VIVELLE-DOT) 0.05 MG/24HR bi-weekly patch PLACE 1 PATCH TOPICALLY ONTO CLEAN SKIN TWICE WEEKLY     Fexofenadine HCl (ALLEGRA PO)      Needle, Disp, 25G X 1-1/2\" MISC Use as directed with syringe monthly for B12 injections     syringe, disposable, 1 ML MISC Use with needle as directed for monthly B12 injections     topiramate (TOPAMAX) " 50 MG tablet Take 1 tablet (50 mg) by mouth 2 times daily     vitamin B-12 (CYANOCOBALAMIN) 1000 MCG CR tablet Take 1 tablet (1,000 mcg) by mouth daily (Patient not taking: No sig reported)     vitamin D3 (CHOLECALCIFEROL) 50 mcg (2000 units) tablet Take 1 tablet (50 mcg) by mouth daily     Current Facility-Administered Medications   Medication     cyanocobalamin injection 1,000 mcg     cyanocobalamin injection 1,000 mcg     Allergies   Allergen Reactions     Seasonal Allergies          OBJECTIVE:     No vitals were obtained today due to virtual visit.    Physical Exam    NEURO:   Mentation and speech appropriate for age.  PSYCH: Mentation appears normal, affect normal for her baseline, a bit flat and slower speaking but as per baseline, judgement and insight intact, normal speech       ASSESSMENT/PLAN:                                                      Phone call duration: 15 minutes in addition to 5 minutes of chart review and chart completion on same DOS      ICD-10-CM    1. Confusion and disorientation  R41.0    2. Depression, major, recurrent, moderate (H)  F33.1    3. Anxiety  F41.9          Patient is not in person for vitals or other neuro exam to confirm that this is not something related to CV or neuro health.  DDx would include hypoglycemia, dehydration and new increased med dose all at once vs arrhythmia, vasovagal/near syncopal episode, TIA, abscence sz, etc.  All of the latter, if ever happens again, would need formal work up with appropriate specialist. However has sx not c/w with those other more significant diagnoses and is much more likely that hadn't had enough to eat and drink that day, had prolonged sitting at work and then on bus and stood up to get off the bus and may have had some orthostatic hypotension, etc.     Never fell or had an injury and admittedly felt better after getting home and eating and drinking and resting.  Very likely just a bad combination of circumstances around the same  day as an increased med dose and hopefully if careful to avoid the other contributing factors besides the med, should not recur.  If this should happen again then needs to stop med completely, or at least go back to 10-20mg and either go to urgent care or ER, etc    Will f/up with me regarding med adjustment in general, in another 5 weeks or so, but let me know sooner if anything like this episode should happen again.    Muna Ramos MD  [facility]

## 2022-05-05 DIAGNOSIS — I10 HYPERTENSION, ESSENTIAL: Primary | ICD-10-CM

## 2022-05-10 DIAGNOSIS — F33.1 DEPRESSION, MAJOR, RECURRENT, MODERATE (H): ICD-10-CM

## 2022-05-10 RX ORDER — BUPROPION HYDROCHLORIDE 150 MG/1
150 TABLET ORAL EVERY MORNING
Qty: 60 TABLET | Refills: 0 | OUTPATIENT
Start: 2022-05-10

## 2022-05-10 NOTE — TELEPHONE ENCOUNTER
"Requested Prescriptions   Pending Prescriptions Disp Refills     buPROPion (WELLBUTRIN XL) 150 MG 24 hr tablet 60 tablet 0     Sig: Take 1 tablet (150 mg) by mouth every morning In addition to the 300mg for a total of 450mg qam       SSRIs Protocol Failed - 5/10/2022  1:44 PM        Failed - PHQ-9 score less than 5 in past 6 months     Please review last PHQ-9 score.           Passed - Medication is Bupropion     If the medication is Bupropion (Wellbutrin), and the patient is taking for smoking cessation; OK to refill.          Passed - Medication is active on med list        Passed - Patient is age 18 or older        Passed - No active pregnancy on record        Passed - No positive pregnancy test in last 12 months        Passed - Recent (6 mo) or future (30 days) visit within the authorizing provider's specialty     Patient had office visit in the last 6 months or has a visit in the next 30 days with authorizing provider or within the authorizing provider's specialty.  See \"Patient Info\" tab in inbasket, or \"Choose Columns\" in Meds & Orders section of the refill encounter.               Last Written Prescription Date:  4/18/22  Last Fill Quantity: 60,  # refills: 0   Last office visit: Video visit: 4/29/2022 with prescribing provider:  Rachel   Future Office Visit:      EDVIN & PHQ questionnaires sent via [employer] for pt to updated.  Pt needs a follow-up phone visit prior to additional refills.  Erma Betancourt RN on 5/10/2022 at 2:04 PM      "

## 2022-05-27 ENCOUNTER — VIRTUAL VISIT (OUTPATIENT)
Dept: OBGYN | Facility: CLINIC | Age: 52
End: 2022-05-27
Payer: COMMERCIAL

## 2022-05-27 DIAGNOSIS — F33.1 DEPRESSION, MAJOR, RECURRENT, MODERATE (H): Primary | ICD-10-CM

## 2022-05-27 DIAGNOSIS — R53.82 CHRONIC FATIGUE: ICD-10-CM

## 2022-05-27 DIAGNOSIS — R41.840 INATTENTION: ICD-10-CM

## 2022-05-27 DIAGNOSIS — F41.9 ANXIETY: ICD-10-CM

## 2022-05-27 PROCEDURE — 99214 OFFICE O/P EST MOD 30 MIN: CPT | Mod: 95 | Performed by: OBSTETRICS & GYNECOLOGY

## 2022-05-27 RX ORDER — BUPROPION HYDROCHLORIDE 300 MG/1
TABLET ORAL
Qty: 90 TABLET | Refills: 3 | Status: SHIPPED | OUTPATIENT
Start: 2022-05-27 | End: 2022-06-24

## 2022-05-27 RX ORDER — BUPROPION HYDROCHLORIDE 150 MG/1
150 TABLET ORAL EVERY MORNING
Qty: 90 TABLET | Refills: 3 | Status: SHIPPED | OUTPATIENT
Start: 2022-05-27 | End: 2023-07-14

## 2022-05-27 RX ORDER — ESCITALOPRAM OXALATE 10 MG/1
10 TABLET ORAL DAILY
Qty: 90 TABLET | Refills: 3 | Status: SHIPPED | OUTPATIENT
Start: 2022-05-27

## 2022-05-27 NOTE — PROGRESS NOTES
Ximena Bess is a 52 year old female who is being evaluated via a billable telephone visit.      What phone number would you like to be contacted at? [phone]  How would you like to obtain your AVS? Mendy      SUBJECTIVE:                                                   Ximena Bess is a 52 year old female who presents for virtual visit today for the following health issue(s):  Patient presents with:  Medication Follow-up: Patient increased dose of Wellbutrin and is doing better.      HPI:  Patient reports feeling better since we spoke last and denies any further episodes of confusion, lost time, etc like she had a few days after med adjustment done. Does agree that could likely have been hypoglycemia, inadequate hydration, long work day and bus commute combined with new med dosing.     Has been doing 30mg cheng and 450mg wellbutrin for about 6 weeks and is finally seeing improvement. Anxiety is very minimal. Depression is still definitely needing some additional improvement and mostly b/c so much chronic fatigue, lack of motivation, etc. However finally is feeling more hopeful and more optimistic compared to the last several months. Has finally gotten to the point where wants to pursue finding a new therapist and hasn't been motivated to do that in quite a long time after her other therapist was not a great fit.  Did that through work [employer] and only a couple of sessions and then stopped going as didn't feel they connected. Wondering about recs for a therapist.     Patient has a hard time distinguishing between medication effect and depression in terms of her constant fatigue and has felt fatigued for so long that not even sure what's normal anymore but it is the most bothersome part of her last couple of year struggle.  Continues to walk her dog at least once a day, goes in to work 3x/week at least in person and then has coworker and patient interactions. O/w lives alone and her sister who she was  closest to moved out of state. Does have a couple close friends and some extended family in town though.      No LMP recorded. Patient has had a hysterectomy..   Patient is not sexually active, .   reports that she has never smoked. She has never used smokeless tobacco.  Health maintenance updated:  yes    PHQ-9 SCORE 2022   PHQ-9 Total Score MyChart  7 (Mild depression)   PHQ-9 Total Score 15 7   EDVIN-7 SCORE 2022   Total Score  4 (minimal anxiety)   Total Score 7 4       Problem list and histories reviewed & adjusted, as indicated.  Additional history: as documented.    Patient Active Problem List   Diagnosis     Depression, major, recurrent, moderate (H)     Vasomotor symptoms due to menopause     S/P total hysterectomy and bilateral salpingo-oophorectomy     Elevated serum creatinine     Migraine with aura and without status migrainosus, not intractable     Vitamin B12 deficiency (non anemic)     Esophagitis determined by biopsy     Stage 3a chronic kidney disease (H)     Bilateral leg pain     Essential (primary) hypertension     Positive MARIBELL (antinuclear antibody)     Nephritis interstitial chronic     Post-menopause on HRT (hormone replacement therapy)     Anxiety     Neck pain     Inattention     Chronic fatigue     Past Surgical History:   Procedure Laterality Date     BIOPSY  Mar 2021     COLONOSCOPY  2020     HYSTERECTOMY VAGINAL, BILATERAL SALPINGO-OOPHERECTOMY, COMBINED       TONSILLECTOMY        Social History     Tobacco Use     Smoking status: Never Smoker     Smokeless tobacco: Never Used   Substance Use Topics     Alcohol use: Yes     Alcohol/week: 0.0 standard drinks      Problem (# of Occurrences) Relation (Name,Age of Onset)    Asthma (2) Sister (Karen), Daughter (Oni)    Cerebrovascular Disease (1) Daughter (Sindy)    Colon Cancer (1) Father (Dad)    Depression (1) Sister (Krystal)    Lactose Intolerance (1) Father (Dad, 55)    No Known Problems  "(6) Mother, Brother, Maternal Grandmother, Maternal Grandfather, Paternal Grandmother, Other    Prostate Cancer (1) Father (Dad)            Current Outpatient Medications   Medication Sig     buPROPion (WELLBUTRIN XL) 150 MG 24 hr tablet Take 1 tablet (150 mg) by mouth every morning In addition to the 300mg for a total of 450mg qam     buPROPion (WELLBUTRIN XL) 300 MG 24 hr tablet TAKE 1 TABLET BY MOUTH EVERY MORNING     carvedilol (COREG) 6.25 MG tablet TAKE 1 TABLET(6.25 MG) BY MOUTH TWICE DAILY     cyanocobalamin (CYANOCOBALAMIN) 1000 MCG/ML injection Inject 1 mL (1,000 mcg) into the muscle every 30 days     escitalopram (LEXAPRO) 10 MG tablet Take 1 tablet (10 mg) by mouth daily ALONG WITH THE 20MG FOR A TOTAL OF 30MG PO QDAY     escitalopram (LEXAPRO) 20 MG tablet TAKE 1 TABLET BY MOUTH DAILY.     estradiol (VIVELLE-DOT) 0.05 MG/24HR bi-weekly patch PLACE 1 PATCH TOPICALLY ONTO CLEAN SKIN TWICE WEEKLY     Fexofenadine HCl (ALLEGRA PO)      Needle, Disp, 25G X 1-1/2\" MISC Use as directed with syringe monthly for B12 injections     syringe, disposable, 1 ML MISC Use with needle as directed for monthly B12 injections     topiramate (TOPAMAX) 50 MG tablet Take 1 tablet (50 mg) by mouth 2 times daily     vitamin D3 (CHOLECALCIFEROL) 50 mcg (2000 units) tablet Take 1 tablet (50 mcg) by mouth daily     vitamin B-12 (CYANOCOBALAMIN) 1000 MCG CR tablet Take 1 tablet (1,000 mcg) by mouth daily (Patient not taking: No sig reported)     Current Facility-Administered Medications   Medication     cyanocobalamin injection 1,000 mcg     cyanocobalamin injection 1,000 mcg     Allergies   Allergen Reactions     Seasonal Allergies          OBJECTIVE:     No vitals were obtained today due to virtual visit.      PSYCH: Mentation appears normal, affect normal/bright, judgement and insight intact, normal speech          ASSESSMENT/PLAN:                                                      Phone call duration: 25 minutes and 5 minutes " of chart review, on the same date of service, for a total of 30 min.      ICD-10-CM    1. Depression, major, recurrent, moderate (H)  F33.1 buPROPion (WELLBUTRIN XL) 150 MG 24 hr tablet   2. Anxiety  F41.9 escitalopram (LEXAPRO) 10 MG tablet   3. Chronic fatigue  R53.82    4. Inattention  R41.840            We discussed side-effects of SSRI and Welbutrin as well as potential for switch to SNRI to see if selective serotonin reuptake inhibitor is too flattening and part of the fatigue she is feeling. However have encouraged her to pursue seeing a psychiatrist, and ADD and fatigue eval. Stimulant for those could very well be the missing piece to her sx the last few years while also would help with depression to some degree even if that's not the main indication for it. If does end up doing a stimulant through psych then can certainly take over her other psych meds. If doesn't do that then can continue to manage those meds with me.    Does have +MARIBELL though no autoimmune condition specifically ever dx'd so could also be contributing to her fatigue in conjunction with other issues as above.    Recommend she reach out to [facility] for [facility] and can get a therapist through the same clinic to consolidate her care and information flow.      Patient will continue taking the 300mg + 150mg Welbutrin and 20mg + 10mg Lexapro as definitely feeling like both of these dose increases have finally helped some. Refills on the extra dose meds sent in for the year and can adjust as needed.     Patient will schedule appointment in late August, early September to touch base with me and keep me updated on other eval as above.      Muna Ramos MD  [facility]

## 2022-05-28 PROBLEM — R53.82 CHRONIC FATIGUE: Status: ACTIVE | Noted: 2022-05-28

## 2022-05-28 PROBLEM — R41.840 INATTENTION: Status: ACTIVE | Noted: 2022-05-28

## 2022-06-24 DIAGNOSIS — F33.1 DEPRESSION, MAJOR, RECURRENT, MODERATE (H): Primary | ICD-10-CM

## 2022-06-24 DIAGNOSIS — G43.109 MIGRAINE WITH AURA AND WITHOUT STATUS MIGRAINOSUS, NOT INTRACTABLE: ICD-10-CM

## 2022-06-24 RX ORDER — BUPROPION HYDROCHLORIDE 300 MG/1
TABLET ORAL
Qty: 90 TABLET | Refills: 3 | Status: SHIPPED | OUTPATIENT
Start: 2022-06-24

## 2022-06-24 NOTE — TELEPHONE ENCOUNTER
"Requested Prescriptions   Pending Prescriptions Disp Refills     buPROPion (WELLBUTRIN XL) 300 MG 24 hr tablet [Pharmacy Med Name: BUPROPION XL 300MG TABLETS] 90 tablet 3     Sig: TAKE 1 TABLET BY MOUTH EVERY MORNING       SSRIs Protocol Passed - 6/24/2022  5:32 AM        Passed - Recent (12 mo) or future (30 days) visit within the authorizing provider's specialty     Patient has had an office visit with the authorizing provider or a provider within the authorizing providers department within the previous 12 mos or has a future within next 30 days. See \"Patient Info\" tab in inbasket, or \"Choose Columns\" in Meds & Orders section of the refill encounter.              Passed - Medication is Bupropion     If the medication is Bupropion (Wellbutrin), and the patient is taking for smoking cessation; OK to refill.          Passed - Medication is active on med list        Passed - Patient is age 18 or older        Passed - No active pregnancy on record        Passed - No positive pregnancy test in last 12 months           Last Written Prescription Date:  5/27/22  Last Fill Quantity: 90,  # refills: 3   Last office visit: 4/18/2022 with prescribing provider:  Rachel   Future Office Visit:      DIFFERENT REQUESTING PHARMACY.    Prescription approved per [facility] Refill Protocol.    Ruthy Morales RN on 6/24/2022 at 6:08 AM        "

## 2022-06-27 RX ORDER — TOPIRAMATE 50 MG/1
TABLET, FILM COATED ORAL
Qty: 180 TABLET | Refills: 3 | Status: SHIPPED | OUTPATIENT
Start: 2022-06-27 | End: 2023-07-14

## 2022-06-27 RX ORDER — ESCITALOPRAM OXALATE 20 MG/1
TABLET ORAL
Qty: 90 TABLET | Refills: 3 | Status: SHIPPED | OUTPATIENT
Start: 2022-06-27 | End: 2023-07-14

## 2022-06-27 NOTE — TELEPHONE ENCOUNTER
"Requested Prescriptions   Pending Prescriptions Disp Refills     topiramate (TOPAMAX) 50 MG tablet [Pharmacy Med Name: TOPIRAMATE 50MG TABLETS] 180 tablet 3     Sig: TAKE 1 TABLET BY MOUTH TWICE DAILY GENERIC EQUIVALENT FOR TOPAMAX       Anti-Seizure Meds Protocol  Failed - 6/24/2022  6:04 PM        Failed - Review Authorizing provider's last note.      Refer to last progress notes: confirm request is for original authorizing provider (cannot be through other providers).          Passed - Recent (12 mo) or future (30 days) visit within the authorizing provider's specialty     Patient has had an office visit with the authorizing provider or a provider within the authorizing providers department within the previous 12 mos or has a future within next 30 days. See \"Patient Info\" tab in inbasket, or \"Choose Columns\" in Meds & Orders section of the refill encounter.              Passed - Normal CBC on file in past 26 months     Recent Labs   Lab Test 04/18/22  1627   WBC 7.3   RBC 4.19   HGB 13.0   HCT 39.5                    Passed - Normal ALT or AST on file in past 26 months     Recent Labs   Lab Test 04/18/22  1627   ALT 37     Recent Labs   Lab Test 04/18/22  1627   AST 26             Passed - Normal platelet count on file in past 26 months     Recent Labs   Lab Test 04/18/22  1627                  Passed - Medication is active on med list        Passed - No active pregnancy on record        Passed - No positive pregnancy test in last 12 months           escitalopram (LEXAPRO) 20 MG tablet [Pharmacy Med Name: ESCITALOPRAM 20MG TABLETS] 90 tablet 3     Sig: TAKE 1 TABLET BY MOUTH EVERY DAY       SSRIs Protocol Passed - 6/24/2022  6:04 PM        Passed - Recent (12 mo) or future (30 days) visit within the authorizing provider's specialty     Patient has had an office visit with the authorizing provider or a provider within the authorizing providers department within the previous 12 mos or has a future " "within next 30 days. See \"Patient Info\" tab in inbasket, or \"Choose Columns\" in Meds & Orders section of the refill encounter.              Passed - Medication is active on med list        Passed - Patient is age 18 or older        Passed - No active pregnancy on record        Passed - No positive pregnancy test in last 12 months           Last Written Prescription Date:  4/25/22, 4/18/22  Last Fill Quantity: 180,90  # refills: 3   Last office visit: 4/18/2022 with prescribing provider:  Rachel   Future Office Visit:  NONE    DIFFERENT REQUESTING PHARMACY    Prescription approved per [facility] Refill Protocol.  Ruthy Morales RN on 6/27/2022 at 5:31 AM            "

## 2022-07-01 ENCOUNTER — TELEPHONE (OUTPATIENT)
Dept: OBGYN | Facility: CLINIC | Age: 52
End: 2022-07-01

## 2022-07-01 NOTE — TELEPHONE ENCOUNTER
Type of Paperwork received:  FMLA     Date Rcvd:  07/01/2022    Rcvd From (Company name): [facility] [employer]    Provider:  Dr. Ramos    Placed on Provider Cart Date:  07/01/2022

## 2022-07-14 ENCOUNTER — MYC MEDICAL ADVICE (OUTPATIENT)
Dept: OBGYN | Facility: CLINIC | Age: 52
End: 2022-07-14

## 2022-07-14 NOTE — TELEPHONE ENCOUNTER
7/1/22 FMLA forms submitted on provider cart - no document of completion or sent.    Routing [employer] message to Dr Rachel ODONNELL - direct pt comments to provider     Routing to scheduling to check no 7/1/22 forms progress.    Sarah Cedeno RN on 7/14/2022 at 10:46 AM

## 2022-07-19 NOTE — TELEPHONE ENCOUNTER
I returned the forms to triage or MA to get more info on what patient needed since didn't recall discussing any leave. Not sure where her paperwork ended up. Is it in triage? With an MA?

## 2022-09-11 ENCOUNTER — HEALTH MAINTENANCE LETTER (OUTPATIENT)
Age: 52
End: 2022-09-11

## 2022-11-10 ENCOUNTER — TRANSFERRED RECORDS (OUTPATIENT)
Dept: HEALTH INFORMATION MANAGEMENT | Facility: CLINIC | Age: 52
End: 2022-11-10

## 2022-12-21 DIAGNOSIS — M79.605 BILATERAL LEG PAIN: ICD-10-CM

## 2022-12-21 DIAGNOSIS — M79.604 BILATERAL LEG PAIN: ICD-10-CM

## 2022-12-21 RX ORDER — CHOLECALCIFEROL (VITAMIN D3) 50 MCG
TABLET ORAL
Qty: 90 TABLET | Refills: 0 | Status: SHIPPED | OUTPATIENT
Start: 2022-12-21

## 2022-12-21 RX ORDER — FERROUS GLUCONATE 240(27)MG
TABLET ORAL
Qty: 90 TABLET | Refills: 0 | Status: SHIPPED | OUTPATIENT
Start: 2022-12-21 | End: 2023-07-14

## 2023-03-14 ENCOUNTER — E-VISIT (OUTPATIENT)
Dept: URGENT CARE | Facility: CLINIC | Age: 53
End: 2023-03-14
Payer: COMMERCIAL

## 2023-03-14 DIAGNOSIS — J01.90 ACUTE SINUSITIS, RECURRENCE NOT SPECIFIED, UNSPECIFIED LOCATION: Primary | ICD-10-CM

## 2023-03-14 PROCEDURE — 99421 OL DIG E/M SVC 5-10 MIN: CPT | Performed by: PREVENTIVE MEDICINE

## 2023-03-14 NOTE — PATIENT INSTRUCTIONS
Sinusitis (Antibiotic Treatment)    The sinuses are air-filled spaces within the bones of the face. They connect to the inside of the nose. Sinusitis is an inflammation of the tissue that lines the sinuses. Sinusitis can occur during a cold. It can also happen due to allergies to pollens and other particles in the air. Sinusitis can cause symptoms of sinus congestion and a feeling of fullness. A sinus infection causes fever, headache, and facial pain. There is often green or yellow fluid draining from the nose or into the back of the throat (post-nasal drip). You have been given antibiotics to treat this condition.   Home care    Take the full course of antibiotics as instructed. Don't stop taking them, even when you feel better.    Drink plenty of water, hot tea, and other liquids as directed by the healthcare provider. This may help thin nasal mucus. It also may help your sinuses drain fluids.    Heat may help soothe painful areas of your face. Use a towel soaked in hot water. Or,  the shower and direct the warm spray onto your face. Using a vaporizer along with a menthol rub at night may also help soothe symptoms.     An expectorant with guaifenesin may help thin nasal mucus and help your sinuses drain fluids. Talk with your provider or pharmacists before taking an over-the-counter (OTC) medicine if you have any questions about it or its side effects..    You can use an OTC decongestant, unless a similar medicine was prescribed to you. Nasal sprays work the fastest. Use one that contains phenylephrine or oxymetazoline. First blow your nose gently. Then use the spray. Don't use these medicines more often than directed on the label. If you do, your symptoms may get worse. You may also take pills that contain pseudoephedrine. Don t use products that combine multiple medicines. This is because side effects may be increased. Read labels. You can also ask the pharmacist for help. (People with high blood  pressure should not use decongestants. They can raise blood pressure.) Talk with your provider or pharmacist if you have any questions about the medicine..    OTC antihistamines may help if allergies contributed to your sinusitis. Talk with your provider or pharmacist if you have any questions about the medicine..    Don't use nasal rinses or irrigation during an acute sinus infection, unless your healthcare provider tells you to. Rinsing may spread the infection to other areas in your sinuses.    Use acetaminophen or ibuprofen to control pain, unless another pain medicine was prescribed to you. If you have chronic liver or kidney disease or ever had a stomach ulcer, talk with your healthcare provider before using these medicines. Never give aspirin to anyone under age 18 who is ill with a fever. It may cause severe liver damage.    Don't smoke. This can make symptoms worse.    Follow-up care  Follow up with your healthcare provider, or as advised.   When to seek medical advice  Call your healthcare provider if any of these occur:     Facial pain or headache that gets worse    Stiff neck    Unusual drowsiness or confusion    Swelling of your forehead or eyelids    Symptoms don't go away in 10 days    Vision problems, such as blurred or double vision    Fever of 100.4 F (38 C) or higher, or as directed by your healthcare provider  Call 911  Call 911 if any of these occur:     Seizure    Trouble breathing    Feeling dizzy or faint    Fingernails, skin or lips look blue, purple , or gray  Prevention  Here are steps you can take to help prevent an infection:     Keep good hand washing habits.    Don t have close contact with people who have sore throats, colds, or other upper respiratory infections.    Don t smoke, and stay away from secondhand smoke.    Stay up to date with of your vaccines.  [employer] last reviewed this educational content on 12/1/2019 2000-2021 [employer]. All rights reserved. This  information is not intended as a substitute for professional medical care. Always follow your healthcare professional's instructions.        You may want to try a nasal lavage (also known as nasal irrigation). You can find over-the-counter products, such as Neti-Pot, at retail locations or make your own at home. Instructions for homemade nasal lavage and more information on the process are available online at http://www.aafp.org/afp/2009/1115/p1121.html.    Dear Ximena Bess    After reviewing your responses, I've been able to diagnose you with Acute sinusitis, recurrence not specified, unspecified location.      Based on your responses and diagnosis, I have prescribed   Orders Placed This Encounter     amoxicillin-clavulanate (AUGMENTIN) 875-125 MG tablet    to treat your symptoms. I have sent this to your pharmacy.?     It is also important to stay well hydrated, get lots of rest and take over-the-counter decongestants,?tylenol?or ibuprofen if you?are able to?take those medications per your primary care provider to help relieve discomfort.?     It is important that you take?all of?your prescribed medication even if your symptoms are improving after a few doses.? Taking?all of?your medicine helps prevent the symptoms from returning.?     If your symptoms worsen, you develop severe headache, vomiting, high fever (>102), or are not improving in 7 days, please contact your primary care provider for an appointment or visit any of our convenient Walk-in Care or Urgent Care Centers to be seen which can be found on our website?here.?     Thanks again for choosing?us?as your health care partner,?   ?  Jordy Cullen MD, MD?

## 2023-03-16 ENCOUNTER — VIRTUAL VISIT (OUTPATIENT)
Dept: FAMILY MEDICINE | Facility: CLINIC | Age: 53
End: 2023-03-16
Payer: COMMERCIAL

## 2023-03-16 ENCOUNTER — E-VISIT (OUTPATIENT)
Dept: URGENT CARE | Facility: CLINIC | Age: 53
End: 2023-03-16
Payer: COMMERCIAL

## 2023-03-16 DIAGNOSIS — R19.7 DIARRHEA, UNSPECIFIED TYPE: ICD-10-CM

## 2023-03-16 DIAGNOSIS — J34.89 SINUS PRESSURE: ICD-10-CM

## 2023-03-16 DIAGNOSIS — T88.7XXA MEDICATION SIDE EFFECTS: Primary | ICD-10-CM

## 2023-03-16 DIAGNOSIS — R50.9 FEVER, UNSPECIFIED FEVER CAUSE: Primary | ICD-10-CM

## 2023-03-16 PROCEDURE — 99213 OFFICE O/P EST LOW 20 MIN: CPT | Mod: VID | Performed by: FAMILY MEDICINE

## 2023-03-16 PROCEDURE — 99207 PR NON-BILLABLE SERV PER CHARTING: CPT | Performed by: PREVENTIVE MEDICINE

## 2023-03-16 ASSESSMENT — PATIENT HEALTH QUESTIONNAIRE - PHQ9
SUM OF ALL RESPONSES TO PHQ QUESTIONS 1-9: 13
SUM OF ALL RESPONSES TO PHQ QUESTIONS 1-9: 13
10. IF YOU CHECKED OFF ANY PROBLEMS, HOW DIFFICULT HAVE THESE PROBLEMS MADE IT FOR YOU TO DO YOUR WORK, TAKE CARE OF THINGS AT HOME, OR GET ALONG WITH OTHER PEOPLE: EXTREMELY DIFFICULT

## 2023-03-16 NOTE — PATIENT INSTRUCTIONS
Dear Ximena Bess,    We are sorry you are not feeling well. Based on the responses you provided, it is recommended that you be seen in-person in urgent care so we can better evaluate your symptoms. Please click here to find the nearest urgent care location to you.   You will not be charged for this Visit. Thank you for trusting us with your care.    Jordy Cullen MD, MD

## 2023-03-16 NOTE — LETTER
[address]  [address]  [place] [address]  Phone: [phone]  Fax: [phone]    March 16, 2023        Ximena Bess  [address]   [facility] [address]          To whom it may concern:    RE: Ximena Bess    Please excuse pt from work on 3/21/23 and 3/23/23 due to acute illness.    Please contact me for questions or concerns.      Sincerely,        Rigoberto Lucia, DO

## 2023-03-16 NOTE — PROGRESS NOTES
Ximena is a 52 year old who is being evaluated via a billable video visit.          Assessment & Plan     Medication side effects  Diarrhea, unspecified type  -Start of diarrhea correlates with first dose of Augmentin. Discussed discontinuing Augmentin and monitoring symptoms. Diarrhea as subsided in the last two hours.   -UC/ED if severe abdominal pain, fever, blood in stool, watery stools    Sinus pressure  -Sinus pressure improved. ? Bacterial sinusitis  -Discussed stopping abx and monitoring symptoms. Can try alternative abx should symptoms return.    Work note provided.    Rigoberto Lucia DO  [facility]    Subjective   Ximena is a 52 year old, presenting for the following health issues:  No chief complaint on file.    Had Evisit two days ago for sinus infection, started on Augmentin . At this time she had low grade fever, body aches, runny nose, face pain, fatigue x 1 week. Still experiencing body aches. No longer having facial pain.     Took her first dose of Augmentin last night.Woke up last night with stomach pain and diarrhea. Having liquid diarrhea after. Has been two hours since she had to go to the bathroom. 8 to 10 episodes. Mild abdominal discomfort currently, no urge to use the restroom. No blood in stool. Denies fever, chills, nausea, vomiting.    History of Present Illness       Reason for visit:  Diarrhea and stomach pain  Symptom onset:  1-3 days ago  Symptoms include:  Pain and several bowel movements that are diarrhea  Symptom intensity:  Moderate  Symptom progression:  Staying the same  Had these symptoms before:  No    She eats 2-3 servings of fruits and vegetables daily.She consumes 0 sweetened beverage(s) daily.She exercises with enough effort to increase her heart rate 10 to 19 minutes per day.  She exercises with enough effort to increase her heart rate 3 or less days per week.   She is taking medications regularly.    Today's PHQ-9         PHQ-9 Total Score:  13    PHQ-9 Q9 Thoughts of better off dead/self-harm past 2 weeks :   Not at all    How difficult have these problems made it for you to do your work, take care of things at home, or get along with other people: Extremely difficult             Review of Systems         Objective           Vitals:  No vitals were obtained today due to virtual visit.    Physical Exam   GENERAL: Healthy, alert and no distress  EYES: Eyes grossly normal to inspection.  No discharge or erythema, or obvious scleral/conjunctival abnormalities.  RESP: No audible wheeze, cough, or visible cyanosis.  No visible retractions or increased work of breathing.    SKIN: Visible skin clear. No significant rash, abnormal pigmentation or lesions.  NEURO: Cranial nerves grossly intact.  Mentation and speech appropriate for age.  PSYCH: Mentation appears normal, affect normal/bright, judgement and insight intact, normal speech and appearance well-groomed.                Video-Visit Details    Type of service:  Video Visit     Originating Location (pt. Location): Home    Distant Location (provider location):  Off-site  Platform used for Video Visit: Sophia

## 2023-05-03 ENCOUNTER — TELEPHONE (OUTPATIENT)
Dept: OBGYN | Facility: CLINIC | Age: 53
End: 2023-05-03
Payer: COMMERCIAL

## 2023-05-03 DIAGNOSIS — N95.1 SYMPTOMS, SUCH AS FLUSHING, SLEEPLESSNESS, HEADACHE, LACK OF CONCENTRATION, ASSOCIATED WITH THE MENOPAUSE: ICD-10-CM

## 2023-05-03 RX ORDER — ESTRADIOL 0.05 MG/D
PATCH, EXTENDED RELEASE TRANSDERMAL
Qty: 24 PATCH | Refills: 0 | Status: SHIPPED | OUTPATIENT
Start: 2023-05-03 | End: 2023-07-10

## 2023-05-03 NOTE — TELEPHONE ENCOUNTER
Requested Prescriptions   Pending Prescriptions Disp Refills     estradiol (VIVELLE-DOT) 0.05 MG/24HR bi-weekly patch 24 patch 3     Sig: PLACE 1 PATCH TOPICALLY ONTO CLEAN SKIN TWICE WEEKLY       There is no refill protocol information for this order        Last Written Prescription Date:  4/25/22  Last Fill Quantity: #24, 3 refills   Last office visit:     Future Appointments 5/3/2023 - 10/30/2023      Date Visit Type Length Department Provider     7/14/2023  2:45 PM PHYSICAL 30 min [facility] Muna Ramos MD    Location Instructions:     The clinic is located at [address] [place] [address], [address] [address]                 Medication is being filled for 1 time refill only due to:  Patient needs to be seen because it has been more than one year since last visit.    Reina Gil RN

## 2023-05-03 NOTE — TELEPHONE ENCOUNTER
Reason for call: [facility] calling to get a refill for pt     Medication: estradiol (VIVELLE-DOT) 0.05 MG/24HR bi-weekly patch    Pt DOES have annual scheduled    Pharmacy: [facility] - [place], [place] - [address]     Phone number to reach patient:  [phone]    Best Time:  Anytime    Can we leave a detailed message on this number?  YES    Travel screening: Not Applicable

## 2023-06-03 ENCOUNTER — HEALTH MAINTENANCE LETTER (OUTPATIENT)
Age: 53
End: 2023-06-03

## 2023-07-09 DIAGNOSIS — N95.1 SYMPTOMS, SUCH AS FLUSHING, SLEEPLESSNESS, HEADACHE, LACK OF CONCENTRATION, ASSOCIATED WITH THE MENOPAUSE: ICD-10-CM

## 2023-07-10 RX ORDER — ESTRADIOL 0.05 MG/D
PATCH, EXTENDED RELEASE TRANSDERMAL
Qty: 24 PATCH | Refills: 0 | Status: SHIPPED | OUTPATIENT
Start: 2023-07-10 | End: 2023-08-20

## 2023-07-10 NOTE — TELEPHONE ENCOUNTER
"Requested Prescriptions   Pending Prescriptions Disp Refills     estradiol (VIVELLE-DOT) 0.05 MG/24HR bi-weekly patch [Pharmacy Med Name: Estradiol 0.05mg/24hr Transdermal System (Twice-Weekly)] 24 patch 0     Sig: Apply 1 patch topically onto clean skin twice weekly.       Hormone Replacement Therapy Failed - 7/9/2023  2:29 PM        Failed - Blood pressure under 140/90 in past 12 months     BP Readings from Last 3 Encounters:   04/18/22 110/76   12/15/21 120/82   12/03/21 105/74                 Failed - Recent (12 mo) or future (30 days) visit within the authorizing provider's specialty     Patient has had an office visit with the authorizing provider or a provider within the authorizing providers department within the previous 12 mos or has a future within next 30 days. See \"Patient Info\" tab in inbasket, or \"Choose Columns\" in Meds & Orders section of the refill encounter.              Passed - Patient has mammogram in past 2 years on file if age 50-75        Passed - Medication is active on med list        Passed - Patient is 18 years of age or older        Passed - No active pregnancy on record        Passed - No positive pregnancy test on record in past 12 months           Prescription approved per [facility] Refill Protocol.  Mindi Jennings RN on 7/10/2023 at 9:56 AM    "

## 2023-07-11 NOTE — PROGRESS NOTES
Ximena is a 53 year old  female who presents for annual exam.     Besides routine health maintenance, she has no other health concerns today .    HPI:  The patient's PCP is Trish Steele DO.      Patient is here for her annual exam but has some additional things that I manage and follow for her.    She had MAURICE/BSO in  and has since been in a menopausal state. Wondering if her vivelle 0.05mg is worsening her melasma and if she maybe should try to wean off of it to see if related. No v.m sx at all and no s.e from it. Not sexually active in a long time so no vaginal complaints of note. No urinary sx either.       She is overwhelmed by current struggle with weight which she feels like happened d/t Lexapro. Started  and has slowly titrated up to 30mg and definitely feels that it has helped her anxiety and depression quite a bit. However despite that she has gained so much weight, after having lost quite a bit which was actually due to depression and weight loss and not intentional, that wants to stop it regardless of benefit. Has recently started to see a psych provider who is managing that with her and are actually now trialing meds for ADD/chronic fatigue. Hoping that by finding something that works well for that she may not need the other meds. Tried ritalin and no benefit. Was just recently Rx'd vyvanse so is going to see how that goes.    Continues to take Wellbutrin while planning to fully stop the lexapro and close to that based on titration schedule. Knows that wellbutrin alone could worsen anxiety w/o the selective serotonin reuptake inhibitor but is going to monitor for that as really wants to stop it so can lose weight.     She did some EMDR which was really helpful and advocates for the experience to others. She now has found a therapist she really likes and seeing her regularly and it has been really great.     Paternal Fhx of colon cancer, so due for a C.S this year.      Has had lots of  issues with her current boss at work and has been unhappy there for a long time. 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 The position is more public health focused in general vs specifically medical and is feeling excited about her future for the first time in a long time    Hasn't seen Dr. Vergara in a couple of years. Was to fup annually but hasn't. Her creat is high and GFR borderline. [place] to be mostly due to PPI use. Still has reflux/heart burn but deals with it as can't take PPI now. Tries to change food choices as that also helps.      GYNECOLOGIC HISTORY:    No LMP recorded. Patient has had a hysterectomy.    Her current contraception method is: hysterectomy.  She  reports that she has never smoked. She has never used smokeless tobacco.    Patient is not sexually active.  STD testing offered?  Declined  Last PHQ-9 score on record =       2023     3:10 PM   PHQ-9 SCORE   PHQ-9 Total Score 9     Last GAD7 score on record =       2023     3:10 PM   EDVIN-7 SCORE   Total Score 5     Alcohol Score = 4    HEALTH MAINTENANCE:  Cholesterol:  Recent Labs   Lab Test 21  1100 20  1140   CHOL 207* 213*   HDL 78 82   * 117*   TRIG 52 71     Last Mammo: One year ago, Result: Normal, Next Mammo:  Not scheduled   Pap: (No results found for: GYNINTERP, PAP )    Colonoscopy:  2020, Result: Normal, Next Colonoscopy: 3 years.  Dexa:  NA    Health maintenance updated:   Yes    HEMOGLOBIN  BMP  MICROALBUMIN  LIPID   ZOSTER IMMUNIZATION   Pneumococcal Vaccine: Pediatrics (0 to 5 Years) and At-Risk Patients (6 to 64 Years)   HEPATITIS B IMMUNIZATION   DEPRESSION ACTION PLAN   ADVANCE CARE PLANNING       HISTORY:  OB History    Para Term  AB Living   3 3 3 0 0 3   SAB IAB  Ectopic Multiple Live Births   0 0 0 0 3      # Outcome Date GA Lbr Jorge/2nd Weight Sex Delivery Anes PTL Lv   3 Term 01/01/00    M    SHANNAN   2 Term 01/01/97    F    SHANNAN   1 Term 01/01/90    F    SHANNAN       Patient Active Problem List   Diagnosis     Depression, major, recurrent, moderate (H)     Symptoms, such as flushing, sleeplessness, headache, lack of concentration, associated with the menopause     S/P total hysterectomy and bilateral salpingo-oophorectomy     Elevated serum creatinine     Migraine with aura and without status migrainosus, not intractable     Vitamin B12 deficiency     Esophagitis determined by biopsy     Stage 3a chronic kidney disease (H)     Bilateral leg pain     Essential (primary) hypertension     Positive MARIBELL (antinuclear antibody)     Nephritis interstitial chronic     Post-menopause on HRT (hormone replacement therapy)     Anxiety     Neck pain     Inattention     Chronic fatigue     Class 2 severe obesity due to excess calories with serious comorbidity in adult (H)     Past Surgical History:   Procedure Laterality Date     BIOPSY  Mar 2021     COLONOSCOPY  Jan 2020     HYSTERECTOMY VAGINAL, BILATERAL SALPINGO-OOPHERECTOMY, COMBINED  2003     TONSILLECTOMY        Social History     Tobacco Use     Smoking status: Never     Smokeless tobacco: Never   Substance Use Topics     Alcohol use: Yes     Alcohol/week: 0.0 standard drinks of alcohol      Problem (# of Occurrences) Relation (Name,Age of Onset)    Asthma (2) Sister (Karen), Daughter (Oni)    Depression (1) Sister (Krystal)    Cerebrovascular Disease (1) Daughter (Sindy)    Prostate Cancer (1) Father (Dad)    Lactose Intolerance (1) Father (Dad, 55)    Colon Cancer (1) Father (Dad)    No Known Problems (6) Mother, Brother, Maternal Grandmother, Maternal Grandfather, Paternal Grandmother, Other            Current Outpatient Medications   Medication Sig     buPROPion (WELLBUTRIN XL) 300 MG 24 hr tablet TAKE 1 TABLET BY MOUTH EVERY  "MORNING     escitalopram (LEXAPRO) 10 MG tablet Take 1 tablet (10 mg) by mouth daily ALONG WITH THE 20MG FOR A TOTAL OF 30MG PO QDAY     estradiol (VIVELLE-DOT) 0.05 MG/24HR bi-weekly patch Apply 1 patch topically onto clean skin twice weekly.     Fexofenadine HCl (ALLEGRA PO)      VITAMIN D3 50 MCG (2000 UT) tablet TAKE 1 TABLET BY MOUTH DAILY     VYVANSE 20 MG capsule take 1 capsule by mouth every morning     XIIDRA 5 % opthalmic solution      cyanocobalamin (CYANOCOBALAMIN) 1000 MCG/ML injection Inject 1 mL (1,000 mcg) into the muscle every 30 days     Needle, Disp, 25G X 1-1/2\" MISC Use as directed with syringe monthly for B12 injections     syringe, disposable, 1 ML MISC Use with needle as directed for monthly B12 injections     No current facility-administered medications for this visit.     Allergies   Allergen Reactions     Seasonal Allergies        Past medical, surgical, social and family histories were reviewed and updated in EPIC.    ROS:     EXAM:  /88   Ht 1.575 m (5' 2\")   Wt 88.4 kg (194 lb 12.8 oz)   Breastfeeding No   BMI 35.63 kg/m     BMI: Body mass index is 35.63 kg/m .    PHYSICAL EXAM:  Constitutional:   Appearance: Well nourished, well developed, alert, in no acute distress  Neck:  Lymph Nodes:  No lymphadenopathy present    Thyroid:  Gland size normal, nontender, no nodules or masses present  on palpation  Chest:  Respiratory Effort:  Breathing unlabored, CTA B  Cardiovascular:    Heart: Auscultation:  Regular rate, normal rhythm, no murmurs present  Breasts: Inspection of Breasts:  No lymphadenopathy present., Palpation of Breasts and Axillae:  No masses present on palpation, no breast tenderness., Axillary Lymph Nodes:  No lymphadenopathy present. and No nodularity, asymmetry or nipple discharge bilaterally.  Gastrointestinal:   Abdominal Examination:  Abdomen nontender to palpation, tone normal without rigidity or guarding, no masses present, umbilicus without lesions   Liver " and Spleen:  No hepatomegaly present, liver nontender to palpation    Hernias:  No hernias present  Lymphatic: Lymph Nodes:  No other lymphadenopathy present  Skin:  General Inspection:  No rashes present, no lesions present, no areas of  discoloration  Neurologic:    Mental Status:  Oriented X3.  Normal strength and tone, sensory exam                grossly normal, mentation intact and speech normal.    Psychiatric:   Mentation appears normal and affect normal/bright.         Pelvic Exam:  External Genitalia:     Normal appearance for age, no discharge present, no tenderness present, no inflammatory lesions present, color normal  Vagina:     Normal vaginal vault without central or paravaginal defects, no discharge present, no inflammatory lesions present, no masses present  Bladder:     Nontender to palpation  Urethra:   Urethral Body:  Urethra palpation normal, urethra structural support normal   Urethral Meatus:  No erythema or lesions present  Cervix:    SURGICALLY ABSENT  Uterus:     Surgically absent  Adnexa:     Surgically absent  Perineum:     Perineum within normal limits, no evidence of trauma, no rashes or skin lesions present  Anus:     Anus within normal limits, no hemorrhoids present  Inguinal Lymph Nodes:     No lymphadenopathy present  Pubic Hair:     Normal pubic hair distribution for age  Genitalia and Groin:     No rashes present, no lesions present, no areas of discoloration, no masses present      COUNSELING:   Reviewed preventive health counseling, as reflected in patient instructions       Colorectal Cancer Screening       (Darlene)menopause management    BMI: Body mass index is 35.63 kg/m .  Weight management plan: Patient referred to endocrine and/or weight management specialty    ASSESSMENT:  53 year old female with satisfactory annual exam.    ICD-10-CM    1. Encounter for gynecological examination without abnormal finding  Z01.419       2. Class 2 severe obesity due to excess calories with  serious comorbidity in adult, unspecified BMI (H)  E66.01       3. Vitamin B12 deficiency  E53.8 Vitamin B12      4. Depression, major, recurrent, moderate (H)  F33.1       5. Symptoms, such as flushing, sleeplessness, headache, lack of concentration, associated with the menopause  N95.1 estradiol (VIVELLE-DOT) 0.05 MG/24HR bi-weekly patch      6. Stage 3a chronic kidney disease (H)  N18.31 Comprehensive metabolic panel      7. Screen for colon cancer  Z12.11       8. Encounter for screening mammogram for malignant neoplasm of breast  Z12.31 CANCELED: *MA Screening Digital Bilateral      9. Screening for metabolic disorder  Z13.228 Comprehensive metabolic panel     Comprehensive metabolic panel      10. Screening for thyroid disorder  Z13.29 TSH with free T4 reflex     TSH with free T4 reflex      11. Encounter for vitamin deficiency screening  Z13.21 Vitamin D Deficiency     Ferritin     Vitamin D Deficiency     Vitamin B12     Ferritin      12. Screening for diabetes mellitus  Z13.1 Hemoglobin A1c     Hemoglobin A1c      13. Screening for disorder of blood and blood-forming organs  Z13.0 CBC with platelets     CBC with platelets      14. Screening for cardiovascular condition  Z13.6 Lipid panel reflex to direct LDL Fasting     Lipid panel reflex to direct LDL Fasting          PLAN:    Pap no longer indicated due to hysterectomy    Will be due for c.s in another 1.5 yrs given fhx of colon CA in her dad    Fasting labs today.  Will address any abnormalities once results are back.      Discussed relationship between melasma and estrogen patch vs. Ocp vs just changes with aging, her baseline darker complection, sun damage over the years, etc  Do see melasma in pregnancy and rarely with ocps.  However at such a low dose of ERT do not think this is actually related but could certainly wean off and see if any changes.  Reviewed ERT vs HRT and safety, dosing, hyst nearly 20 yrs ago and on ERT since, etc  Reviewed [facility] and  recent updated info  After weighing pros/cons/data and true impact on melasma does not feel this is likely her biggest cause so is planning to stay on her ERT  Refills of the ERT sent in for her.    Reviewed possibility of Vyvanse to increase anxiety especially w/o selective serotonin reuptake inhibitor and on wellbutrin.  However, this is a theoretic possibility with vyvanse and find in my experience that most don't have anxiety worsening and actually have energy, motivation, focus, improved mood, etc and this lessens anxiety  Defer continued management of this to her psych provider and reviewed stimulants and impact on metabolic rate and weight loss and she may find that this is a great option for her.    Will Schedule mammo as didn't have one for today and need to continue to do it annually if staying on ERT     Patient has had unexplained B12 deficiency in the past. Most recently low normal. Can tell a big impact in her energy when gets low and oral doesn't seem to help her sx or sustain normal levels so has done injections  Once check her levels today will determine if appropriate to continue on those things and then will refill the injection and needles/etc.    21 minutes in addition to the routine annual preventative exam,  were spent on direct management of the patient's other medical issues as above as well as chart review including: imaging, lab work, previous visit notes by this provider, and other provider notes, as well as chart completion on the same DOS      Muna Ramos MD

## 2023-07-14 ENCOUNTER — OFFICE VISIT (OUTPATIENT)
Dept: OBGYN | Facility: CLINIC | Age: 53
End: 2023-07-14
Payer: COMMERCIAL

## 2023-07-14 VITALS
WEIGHT: 194.8 LBS | HEIGHT: 62 IN | SYSTOLIC BLOOD PRESSURE: 120 MMHG | BODY MASS INDEX: 35.85 KG/M2 | DIASTOLIC BLOOD PRESSURE: 88 MMHG

## 2023-07-14 DIAGNOSIS — Z13.29 SCREENING FOR THYROID DISORDER: ICD-10-CM

## 2023-07-14 DIAGNOSIS — N18.31 STAGE 3A CHRONIC KIDNEY DISEASE (H): ICD-10-CM

## 2023-07-14 DIAGNOSIS — N95.1 SYMPTOMS, SUCH AS FLUSHING, SLEEPLESSNESS, HEADACHE, LACK OF CONCENTRATION, ASSOCIATED WITH THE MENOPAUSE: ICD-10-CM

## 2023-07-14 DIAGNOSIS — Z13.228 SCREENING FOR METABOLIC DISORDER: ICD-10-CM

## 2023-07-14 DIAGNOSIS — Z12.11 SCREEN FOR COLON CANCER: ICD-10-CM

## 2023-07-14 DIAGNOSIS — Z13.0 SCREENING FOR DISORDER OF BLOOD AND BLOOD-FORMING ORGANS: ICD-10-CM

## 2023-07-14 DIAGNOSIS — E53.8 VITAMIN B12 DEFICIENCY: ICD-10-CM

## 2023-07-14 DIAGNOSIS — Z13.6 SCREENING FOR CARDIOVASCULAR CONDITION: ICD-10-CM

## 2023-07-14 DIAGNOSIS — Z13.21 ENCOUNTER FOR VITAMIN DEFICIENCY SCREENING: ICD-10-CM

## 2023-07-14 DIAGNOSIS — E66.01 CLASS 2 SEVERE OBESITY DUE TO EXCESS CALORIES WITH SERIOUS COMORBIDITY IN ADULT, UNSPECIFIED BMI (H): ICD-10-CM

## 2023-07-14 DIAGNOSIS — Z01.419 ENCOUNTER FOR GYNECOLOGICAL EXAMINATION WITHOUT ABNORMAL FINDING: Primary | ICD-10-CM

## 2023-07-14 DIAGNOSIS — Z12.31 ENCOUNTER FOR SCREENING MAMMOGRAM FOR MALIGNANT NEOPLASM OF BREAST: ICD-10-CM

## 2023-07-14 DIAGNOSIS — F33.1 DEPRESSION, MAJOR, RECURRENT, MODERATE (H): ICD-10-CM

## 2023-07-14 DIAGNOSIS — E66.812 CLASS 2 SEVERE OBESITY DUE TO EXCESS CALORIES WITH SERIOUS COMORBIDITY IN ADULT, UNSPECIFIED BMI (H): ICD-10-CM

## 2023-07-14 DIAGNOSIS — Z13.1 SCREENING FOR DIABETES MELLITUS: ICD-10-CM

## 2023-07-14 LAB
ERYTHROCYTE [DISTWIDTH] IN BLOOD BY AUTOMATED COUNT: 12 % (ref 10–15)
HBA1C MFR BLD: 5 % (ref 0–5.6)
HCT VFR BLD AUTO: 38.2 % (ref 35–47)
HGB BLD-MCNC: 12.7 G/DL (ref 11.7–15.7)
MCH RBC QN AUTO: 31.1 PG (ref 26.5–33)
MCHC RBC AUTO-ENTMCNC: 33.2 G/DL (ref 31.5–36.5)
MCV RBC AUTO: 94 FL (ref 78–100)
PLATELET # BLD AUTO: 230 10E3/UL (ref 150–450)
RBC # BLD AUTO: 4.08 10E6/UL (ref 3.8–5.2)
WBC # BLD AUTO: 4.6 10E3/UL (ref 4–11)

## 2023-07-14 PROCEDURE — 82306 VITAMIN D 25 HYDROXY: CPT | Performed by: OBSTETRICS & GYNECOLOGY

## 2023-07-14 PROCEDURE — 83036 HEMOGLOBIN GLYCOSYLATED A1C: CPT | Performed by: OBSTETRICS & GYNECOLOGY

## 2023-07-14 PROCEDURE — 82607 VITAMIN B-12: CPT | Performed by: OBSTETRICS & GYNECOLOGY

## 2023-07-14 PROCEDURE — 84443 ASSAY THYROID STIM HORMONE: CPT | Performed by: OBSTETRICS & GYNECOLOGY

## 2023-07-14 PROCEDURE — 80053 COMPREHEN METABOLIC PANEL: CPT | Performed by: OBSTETRICS & GYNECOLOGY

## 2023-07-14 PROCEDURE — 82728 ASSAY OF FERRITIN: CPT | Performed by: OBSTETRICS & GYNECOLOGY

## 2023-07-14 PROCEDURE — 80061 LIPID PANEL: CPT | Performed by: OBSTETRICS & GYNECOLOGY

## 2023-07-14 PROCEDURE — 85027 COMPLETE CBC AUTOMATED: CPT | Performed by: OBSTETRICS & GYNECOLOGY

## 2023-07-14 PROCEDURE — 99213 OFFICE O/P EST LOW 20 MIN: CPT | Mod: 25 | Performed by: OBSTETRICS & GYNECOLOGY

## 2023-07-14 PROCEDURE — 36415 COLL VENOUS BLD VENIPUNCTURE: CPT | Performed by: OBSTETRICS & GYNECOLOGY

## 2023-07-14 PROCEDURE — 99396 PREV VISIT EST AGE 40-64: CPT | Performed by: OBSTETRICS & GYNECOLOGY

## 2023-07-14 RX ORDER — LISDEXAMFETAMINE DIMESYLATE 20 MG/1
CAPSULE ORAL
COMMUNITY
Start: 2023-06-14

## 2023-07-14 RX ORDER — LIFITEGRAST 50 MG/ML
SOLUTION/ DROPS OPHTHALMIC
COMMUNITY
Start: 2023-03-06

## 2023-07-14 ASSESSMENT — ANXIETY QUESTIONNAIRES
5. BEING SO RESTLESS THAT IT IS HARD TO SIT STILL: SEVERAL DAYS
3. WORRYING TOO MUCH ABOUT DIFFERENT THINGS: SEVERAL DAYS
1. FEELING NERVOUS, ANXIOUS, OR ON EDGE: SEVERAL DAYS
IF YOU CHECKED OFF ANY PROBLEMS ON THIS QUESTIONNAIRE, HOW DIFFICULT HAVE THESE PROBLEMS MADE IT FOR YOU TO DO YOUR WORK, TAKE CARE OF THINGS AT HOME, OR GET ALONG WITH OTHER PEOPLE: SOMEWHAT DIFFICULT
6. BECOMING EASILY ANNOYED OR IRRITABLE: SEVERAL DAYS
GAD7 TOTAL SCORE: 5
2. NOT BEING ABLE TO STOP OR CONTROL WORRYING: NOT AT ALL
GAD7 TOTAL SCORE: 5
7. FEELING AFRAID AS IF SOMETHING AWFUL MIGHT HAPPEN: NOT AT ALL

## 2023-07-14 ASSESSMENT — PATIENT HEALTH QUESTIONNAIRE - PHQ9
5. POOR APPETITE OR OVEREATING: SEVERAL DAYS
SUM OF ALL RESPONSES TO PHQ QUESTIONS 1-9: 9

## 2023-07-15 LAB
ALBUMIN SERPL BCG-MCNC: 4.3 G/DL (ref 3.5–5.2)
ALP SERPL-CCNC: 83 U/L (ref 35–104)
ALT SERPL W P-5'-P-CCNC: 16 U/L (ref 0–50)
ANION GAP SERPL CALCULATED.3IONS-SCNC: 12 MMOL/L (ref 7–15)
AST SERPL W P-5'-P-CCNC: 23 U/L (ref 0–45)
BILIRUB SERPL-MCNC: 0.4 MG/DL
BUN SERPL-MCNC: 15.8 MG/DL (ref 6–20)
CALCIUM SERPL-MCNC: 8.9 MG/DL (ref 8.6–10)
CHLORIDE SERPL-SCNC: 111 MMOL/L (ref 98–107)
CHOLEST SERPL-MCNC: 217 MG/DL
CREAT SERPL-MCNC: 1.05 MG/DL (ref 0.51–0.95)
DEPRECATED HCO3 PLAS-SCNC: 24 MMOL/L (ref 22–29)
FERRITIN SERPL-MCNC: 41 NG/ML (ref 11–328)
GFR SERPL CREATININE-BSD FRML MDRD: 63 ML/MIN/1.73M2
GLUCOSE SERPL-MCNC: 78 MG/DL (ref 70–99)
HDLC SERPL-MCNC: 76 MG/DL
LDLC SERPL CALC-MCNC: 122 MG/DL
NONHDLC SERPL-MCNC: 141 MG/DL
POTASSIUM SERPL-SCNC: 3.9 MMOL/L (ref 3.4–5.3)
PROT SERPL-MCNC: 6.6 G/DL (ref 6.4–8.3)
SODIUM SERPL-SCNC: 147 MMOL/L (ref 136–145)
TRIGL SERPL-MCNC: 94 MG/DL
TSH SERPL DL<=0.005 MIU/L-ACNC: 2.85 UIU/ML (ref 0.3–4.2)
VIT B12 SERPL-MCNC: 278 PG/ML (ref 232–1245)

## 2023-07-16 LAB — DEPRECATED CALCIDIOL+CALCIFEROL SERPL-MC: 39 UG/L (ref 20–75)

## 2023-07-16 NOTE — RESULT ENCOUNTER NOTE
Ximena,    Your cholesterol is normal. The LDL, or bad cholesterol, should be under 130 and not 100 as shown (as long as you don't have any other heart disease risk factors like hypertension or diabetes), and your's is 122 which is normal.    Other labs include your:    Metabolic panel which shows your blood sugar, electrolytes, kidney function, and liver enzymes, which are all mostly normal. Youre creatinine is 1.05 (and your GFR is totally normal) which is actually the best it's been in a couple of years so that's great.  Your sodium and chloride are both a little high. Normally I would say this is either lab calibration/range of error of the test, or mild dehydration, but given the kidney function I want to make sure it's not actually abnormal and then leading to electrolyte imbalance. I'd like you to make your appointment for mammogram and then repeat the metabolic panel just to make sure it normalizes. You don't have to be fasting for the repeat, just make sure you're well hydrated.    Your hgb A1C, which is a test of how your overall blood sugars have been running over a 3 month period of time, is 5% which is normal.    Your thyroid is normal.    Your vitamin D level is 39 with an ideal range of 40-50. It will typically decrease throughout the winter so if you're not already taking 2000 international unit(s)/d of over the counter Vitamin D3 you should plan to do that every day to maintain levels.    Your complete blood count is normal. This shows that you are not anemic with a normal hemoglobin and that your white blood cell count and platelet count are normal as well.    Your ferritin/iron is normal. Your B12 is actually also normal, but low end of normal. We could certainly have you restart on your B12 injections to get that boosted up to see if it helps with energy, etc. I can send in the prescription for that.    Muna Ramos MD

## 2023-07-17 ENCOUNTER — MYC MEDICAL ADVICE (OUTPATIENT)
Dept: OBGYN | Facility: CLINIC | Age: 53
End: 2023-07-17
Payer: COMMERCIAL

## 2023-07-17 DIAGNOSIS — E53.8 VITAMIN B12 DEFICIENCY (NON ANEMIC): ICD-10-CM

## 2023-07-17 DIAGNOSIS — R53.83 OTHER FATIGUE: ICD-10-CM

## 2023-07-17 RX ORDER — CYANOCOBALAMIN 1000 UG/ML
1 INJECTION, SOLUTION INTRAMUSCULAR; SUBCUTANEOUS
Qty: 3 ML | Refills: 4 | Status: SHIPPED | OUTPATIENT
Start: 2023-07-17

## 2023-07-17 NOTE — TELEPHONE ENCOUNTER
7/14/23 annual w Dr Ramos w labs    Pt responding to your [facility] result message - is repeat labs this Thursday too early or ok to repeat the CMP? Orders were not in so thus the clarification. Does she need a urine sample for anything?    Wants to restart B12 injections as well - is this being done at home or here? Please place orders- pended last order from 2021    Sarah Cedeno RN on 7/17/2023 at 11:19 AM

## 2023-07-17 NOTE — TELEPHONE ENCOUNTER
This Thursday is fine to repeat    I don't remember how her B12 was done? Can we ask her but she was doing it monthly and think she was doing it herself at home? But not 100% sure

## 2023-07-19 ENCOUNTER — TELEPHONE (OUTPATIENT)
Dept: OBGYN | Facility: CLINIC | Age: 53
End: 2023-07-19
Payer: COMMERCIAL

## 2023-07-19 DIAGNOSIS — Z13.228 SCREENING FOR METABOLIC DISORDER: Primary | ICD-10-CM

## 2023-07-19 NOTE — TELEPHONE ENCOUNTER
Result notes per Dr Ramos: I'd like you to make your appointment for mammogram and then repeat the metabolic panel just to make sure it normalizes. You don't have to be fasting for the repeat, just make sure you're well hydrated.    Lab order in place. Does not have to be fasting. Please call pt to schedule.    Sarah Cedeno RN on 7/19/2023 at 8:53 AM

## 2023-07-19 NOTE — TELEPHONE ENCOUNTER
Reason for call:  Other   Patient called regarding (reason for call): call back  Additional comments: patient would like to get lab work done along with her mammogram on 7/21. Said she discussed what kind of labs at her last appointment with Dr. Ramos    Phone number to reach patient:  Cell number on file:    Telephone Information:   Mobile [phone]     Best Time:  any    Can we leave a detailed message on this number?  YES    Travel screening: Not Applicable

## 2023-07-21 ENCOUNTER — LAB (OUTPATIENT)
Dept: LAB | Facility: CLINIC | Age: 53
End: 2023-07-21
Payer: COMMERCIAL

## 2023-07-21 ENCOUNTER — ANCILLARY PROCEDURE (OUTPATIENT)
Dept: MAMMOGRAPHY | Facility: CLINIC | Age: 53
End: 2023-07-21
Payer: COMMERCIAL

## 2023-07-21 DIAGNOSIS — Z13.228 SCREENING FOR METABOLIC DISORDER: ICD-10-CM

## 2023-07-21 DIAGNOSIS — Z12.31 ENCOUNTER FOR SCREENING MAMMOGRAM FOR MALIGNANT NEOPLASM OF BREAST: ICD-10-CM

## 2023-07-21 DIAGNOSIS — Z12.31 VISIT FOR SCREENING MAMMOGRAM: ICD-10-CM

## 2023-07-21 LAB
ALBUMIN SERPL BCG-MCNC: 4.1 G/DL (ref 3.5–5.2)
ALP SERPL-CCNC: 90 U/L (ref 35–104)
ALT SERPL W P-5'-P-CCNC: 14 U/L (ref 0–50)
ANION GAP SERPL CALCULATED.3IONS-SCNC: 11 MMOL/L (ref 7–15)
AST SERPL W P-5'-P-CCNC: 31 U/L (ref 0–45)
BILIRUB SERPL-MCNC: 0.4 MG/DL
BUN SERPL-MCNC: 16.8 MG/DL (ref 6–20)
CALCIUM SERPL-MCNC: 9.1 MG/DL (ref 8.6–10)
CHLORIDE SERPL-SCNC: 107 MMOL/L (ref 98–107)
CREAT SERPL-MCNC: 1.18 MG/DL (ref 0.51–0.95)
DEPRECATED HCO3 PLAS-SCNC: 21 MMOL/L (ref 22–29)
GFR SERPL CREATININE-BSD FRML MDRD: 55 ML/MIN/1.73M2
GLUCOSE SERPL-MCNC: 83 MG/DL (ref 70–99)
POTASSIUM SERPL-SCNC: 4.2 MMOL/L (ref 3.4–5.3)
PROT SERPL-MCNC: 6.7 G/DL (ref 6.4–8.3)
SODIUM SERPL-SCNC: 139 MMOL/L (ref 136–145)

## 2023-07-21 PROCEDURE — 80053 COMPREHEN METABOLIC PANEL: CPT

## 2023-07-21 PROCEDURE — 77063 BREAST TOMOSYNTHESIS BI: CPT | Mod: TC | Performed by: RADIOLOGY

## 2023-07-21 PROCEDURE — 77067 SCR MAMMO BI INCL CAD: CPT | Mod: TC | Performed by: RADIOLOGY

## 2023-07-21 PROCEDURE — 36415 COLL VENOUS BLD VENIPUNCTURE: CPT

## 2023-07-21 NOTE — RESULT ENCOUNTER NOTE
Ximena    So your sodium and chloride are totally normal now. I'm not worried about the slightly low carbon dioxide. Seems like the chloride, CO2 and anion gap are constantly just a point or two off on everyone lately so that's just a calibration issue. I would have even figured that your last test was a calibration issue if not for the sodium being fairly high and not wanting to miss something.  So in terms of all of that, we are all good.    Of course your creatinine went up a bit and your GFR went down a bit, but honestly there isn't a huge difference from a year ago and a week ago and it's definitely not a trend or something we have to monitor any more closely than we have been unless something changes, symptom wise, on your end.    So I think we're all good to go at this point.    Muna Ramos MD

## 2023-08-20 RX ORDER — ESTRADIOL 0.05 MG/D
PATCH, EXTENDED RELEASE TRANSDERMAL
Qty: 24 PATCH | Refills: 3 | Status: SHIPPED | OUTPATIENT
Start: 2023-08-20

## 2024-09-15 ENCOUNTER — HEALTH MAINTENANCE LETTER (OUTPATIENT)
Age: 54
End: 2024-09-15